# Patient Record
Sex: MALE | Race: WHITE | Employment: OTHER | URBAN - METROPOLITAN AREA
[De-identification: names, ages, dates, MRNs, and addresses within clinical notes are randomized per-mention and may not be internally consistent; named-entity substitution may affect disease eponyms.]

---

## 2023-03-03 ENCOUNTER — APPOINTMENT (OUTPATIENT)
Dept: GENERAL RADIOLOGY | Age: 68
DRG: 602 | End: 2023-03-03
Attending: STUDENT IN AN ORGANIZED HEALTH CARE EDUCATION/TRAINING PROGRAM
Payer: MEDICARE

## 2023-03-03 ENCOUNTER — HOSPITAL ENCOUNTER (INPATIENT)
Age: 68
LOS: 11 days | Discharge: HOME HEALTH | DRG: 602 | End: 2023-03-14
Attending: STUDENT IN AN ORGANIZED HEALTH CARE EDUCATION/TRAINING PROGRAM | Admitting: FAMILY MEDICINE
Payer: MEDICARE

## 2023-03-03 DIAGNOSIS — L03.116 CELLULITIS OF LEFT LOWER EXTREMITY: ICD-10-CM

## 2023-03-03 DIAGNOSIS — I48.91 ATRIAL FIBRILLATION WITH RAPID VENTRICULAR RESPONSE (HCC): ICD-10-CM

## 2023-03-03 DIAGNOSIS — L03.115 CELLULITIS OF RIGHT LOWER EXTREMITY: Primary | ICD-10-CM

## 2023-03-03 PROBLEM — R60.0 EDEMA OF LOWER EXTREMITY: Status: ACTIVE | Noted: 2023-03-03

## 2023-03-03 LAB
ALBUMIN SERPL-MCNC: 2.9 G/DL (ref 3.5–5)
ALBUMIN/GLOB SERPL: 0.6 (ref 1.1–2.2)
ALP SERPL-CCNC: 87 U/L (ref 45–117)
ALT SERPL-CCNC: 20 U/L (ref 12–78)
ANION GAP SERPL CALC-SCNC: 3 MMOL/L (ref 5–15)
AST SERPL-CCNC: 17 U/L (ref 15–37)
BASOPHILS # BLD: 0.1 K/UL (ref 0–0.1)
BASOPHILS NFR BLD: 1 % (ref 0–1)
BILIRUB SERPL-MCNC: 0.4 MG/DL (ref 0.2–1)
BUN SERPL-MCNC: 12 MG/DL (ref 6–20)
BUN/CREAT SERPL: 13 (ref 12–20)
CALCIUM SERPL-MCNC: 8.6 MG/DL (ref 8.5–10.1)
CHLORIDE SERPL-SCNC: 103 MMOL/L (ref 97–108)
CO2 SERPL-SCNC: 33 MMOL/L (ref 21–32)
COMMENT, HOLDF: NORMAL
CREAT SERPL-MCNC: 0.94 MG/DL (ref 0.7–1.3)
DIFFERENTIAL METHOD BLD: ABNORMAL
EOSINOPHIL # BLD: 0.3 K/UL (ref 0–0.4)
EOSINOPHIL NFR BLD: 3 % (ref 0–7)
ERYTHROCYTE [DISTWIDTH] IN BLOOD BY AUTOMATED COUNT: 14.6 % (ref 11.5–14.5)
GLOBULIN SER CALC-MCNC: 4.7 G/DL (ref 2–4)
GLUCOSE SERPL-MCNC: 115 MG/DL (ref 65–100)
HCT VFR BLD AUTO: 41.2 % (ref 36.6–50.3)
HGB BLD-MCNC: 13.2 G/DL (ref 12.1–17)
IMM GRANULOCYTES # BLD AUTO: 0 K/UL (ref 0–0.04)
IMM GRANULOCYTES NFR BLD AUTO: 0 % (ref 0–0.5)
LACTATE SERPL-SCNC: 2 MMOL/L (ref 0.4–2)
LYMPHOCYTES # BLD: 2.4 K/UL (ref 0.8–3.5)
LYMPHOCYTES NFR BLD: 23 % (ref 12–49)
MCH RBC QN AUTO: 31.3 PG (ref 26–34)
MCHC RBC AUTO-ENTMCNC: 32 G/DL (ref 30–36.5)
MCV RBC AUTO: 97.6 FL (ref 80–99)
MONOCYTES # BLD: 1.3 K/UL (ref 0–1)
MONOCYTES NFR BLD: 12 % (ref 5–13)
NEUTS SEG # BLD: 6.3 K/UL (ref 1.8–8)
NEUTS SEG NFR BLD: 61 % (ref 32–75)
NRBC # BLD: 0 K/UL (ref 0–0.01)
NRBC BLD-RTO: 0 PER 100 WBC
PLATELET # BLD AUTO: 186 K/UL (ref 150–400)
PMV BLD AUTO: 11.6 FL (ref 8.9–12.9)
POTASSIUM SERPL-SCNC: 4.2 MMOL/L (ref 3.5–5.1)
PROT SERPL-MCNC: 7.6 G/DL (ref 6.4–8.2)
RBC # BLD AUTO: 4.22 M/UL (ref 4.1–5.7)
SAMPLES BEING HELD,HOLD: NORMAL
SODIUM SERPL-SCNC: 139 MMOL/L (ref 136–145)
TROPONIN I SERPL HS-MCNC: 4 NG/L (ref 0–57)
WBC # BLD AUTO: 10.3 K/UL (ref 4.1–11.1)

## 2023-03-03 PROCEDURE — 84443 ASSAY THYROID STIM HORMONE: CPT

## 2023-03-03 PROCEDURE — 87040 BLOOD CULTURE FOR BACTERIA: CPT

## 2023-03-03 PROCEDURE — 74011250637 HC RX REV CODE- 250/637: Performed by: STUDENT IN AN ORGANIZED HEALTH CARE EDUCATION/TRAINING PROGRAM

## 2023-03-03 PROCEDURE — 85025 COMPLETE CBC W/AUTO DIFF WBC: CPT

## 2023-03-03 PROCEDURE — 73590 X-RAY EXAM OF LOWER LEG: CPT

## 2023-03-03 PROCEDURE — 74011000250 HC RX REV CODE- 250: Performed by: STUDENT IN AN ORGANIZED HEALTH CARE EDUCATION/TRAINING PROGRAM

## 2023-03-03 PROCEDURE — 65270000029 HC RM PRIVATE

## 2023-03-03 PROCEDURE — 71045 X-RAY EXAM CHEST 1 VIEW: CPT

## 2023-03-03 PROCEDURE — 36415 COLL VENOUS BLD VENIPUNCTURE: CPT

## 2023-03-03 PROCEDURE — 83735 ASSAY OF MAGNESIUM: CPT

## 2023-03-03 PROCEDURE — 99285 EMERGENCY DEPT VISIT HI MDM: CPT

## 2023-03-03 PROCEDURE — 83605 ASSAY OF LACTIC ACID: CPT

## 2023-03-03 PROCEDURE — 84484 ASSAY OF TROPONIN QUANT: CPT

## 2023-03-03 PROCEDURE — 65660000001 HC RM ICU INTERMED STEPDOWN

## 2023-03-03 PROCEDURE — 96374 THER/PROPH/DIAG INJ IV PUSH: CPT

## 2023-03-03 PROCEDURE — 80053 COMPREHEN METABOLIC PANEL: CPT

## 2023-03-03 RX ORDER — ACETAMINOPHEN 325 MG/1
650 TABLET ORAL
Status: COMPLETED | OUTPATIENT
Start: 2023-03-03 | End: 2023-03-03

## 2023-03-03 RX ORDER — DILTIAZEM HYDROCHLORIDE 5 MG/ML
20 INJECTION INTRAVENOUS
Status: COMPLETED | OUTPATIENT
Start: 2023-03-03 | End: 2023-03-03

## 2023-03-03 RX ADMIN — DILTIAZEM HYDROCHLORIDE 20 MG: 5 INJECTION INTRAVENOUS at 23:49

## 2023-03-03 RX ADMIN — ACETAMINOPHEN 650 MG: 325 TABLET ORAL at 23:37

## 2023-03-04 LAB
ALBUMIN SERPL-MCNC: 2.8 G/DL (ref 3.5–5)
ALBUMIN/GLOB SERPL: 0.7 (ref 1.1–2.2)
ALP SERPL-CCNC: 82 U/L (ref 45–117)
ALT SERPL-CCNC: 18 U/L (ref 12–78)
ANION GAP SERPL CALC-SCNC: 9 MMOL/L (ref 5–15)
APTT PPP: 25.8 SEC (ref 22.1–31)
AST SERPL-CCNC: 20 U/L (ref 15–37)
BASOPHILS # BLD: 0.1 K/UL (ref 0–0.1)
BASOPHILS NFR BLD: 1 % (ref 0–1)
BILIRUB SERPL-MCNC: 0.4 MG/DL (ref 0.2–1)
BNP SERPL-MCNC: 1513 PG/ML
BUN SERPL-MCNC: 12 MG/DL (ref 6–20)
BUN/CREAT SERPL: 15 (ref 12–20)
CALCIUM SERPL-MCNC: 8.3 MG/DL (ref 8.5–10.1)
CHLORIDE SERPL-SCNC: 106 MMOL/L (ref 97–108)
CO2 SERPL-SCNC: 26 MMOL/L (ref 21–32)
CREAT SERPL-MCNC: 0.78 MG/DL (ref 0.7–1.3)
DIFFERENTIAL METHOD BLD: ABNORMAL
EOSINOPHIL # BLD: 0.3 K/UL (ref 0–0.4)
EOSINOPHIL NFR BLD: 3 % (ref 0–7)
ERYTHROCYTE [DISTWIDTH] IN BLOOD BY AUTOMATED COUNT: 14.6 % (ref 11.5–14.5)
EST. AVERAGE GLUCOSE BLD GHB EST-MCNC: 111 MG/DL
GLOBULIN SER CALC-MCNC: 3.8 G/DL (ref 2–4)
GLUCOSE BLD STRIP.AUTO-MCNC: 103 MG/DL (ref 65–117)
GLUCOSE BLD STRIP.AUTO-MCNC: 118 MG/DL (ref 65–117)
GLUCOSE BLD STRIP.AUTO-MCNC: 138 MG/DL (ref 65–117)
GLUCOSE BLD STRIP.AUTO-MCNC: 188 MG/DL (ref 65–117)
GLUCOSE SERPL-MCNC: 90 MG/DL (ref 65–100)
HBA1C MFR BLD: 5.5 % (ref 4–5.6)
HCT VFR BLD AUTO: 42.3 % (ref 36.6–50.3)
HGB BLD-MCNC: 13.4 G/DL (ref 12.1–17)
IMM GRANULOCYTES # BLD AUTO: 0 K/UL (ref 0–0.04)
IMM GRANULOCYTES NFR BLD AUTO: 0 % (ref 0–0.5)
LYMPHOCYTES # BLD: 2.7 K/UL (ref 0.8–3.5)
LYMPHOCYTES NFR BLD: 22 % (ref 12–49)
MAGNESIUM SERPL-MCNC: 1.9 MG/DL (ref 1.6–2.4)
MAGNESIUM SERPL-MCNC: 1.9 MG/DL (ref 1.6–2.4)
MCH RBC QN AUTO: 31.2 PG (ref 26–34)
MCHC RBC AUTO-ENTMCNC: 31.7 G/DL (ref 30–36.5)
MCV RBC AUTO: 98.4 FL (ref 80–99)
MONOCYTES # BLD: 1.3 K/UL (ref 0–1)
MONOCYTES NFR BLD: 11 % (ref 5–13)
NEUTS SEG # BLD: 7.6 K/UL (ref 1.8–8)
NEUTS SEG NFR BLD: 63 % (ref 32–75)
NRBC # BLD: 0 K/UL (ref 0–0.01)
NRBC BLD-RTO: 0 PER 100 WBC
PLATELET # BLD AUTO: 186 K/UL (ref 150–400)
PMV BLD AUTO: 11.5 FL (ref 8.9–12.9)
POTASSIUM SERPL-SCNC: 4.2 MMOL/L (ref 3.5–5.1)
PROT SERPL-MCNC: 6.6 G/DL (ref 6.4–8.2)
RBC # BLD AUTO: 4.3 M/UL (ref 4.1–5.7)
SERVICE CMNT-IMP: ABNORMAL
SERVICE CMNT-IMP: NORMAL
SODIUM SERPL-SCNC: 141 MMOL/L (ref 136–145)
THERAPEUTIC RANGE,PTTT: NORMAL SECS (ref 58–77)
TSH SERPL DL<=0.05 MIU/L-ACNC: 3.19 UIU/ML (ref 0.36–3.74)
WBC # BLD AUTO: 12 K/UL (ref 4.1–11.1)

## 2023-03-04 PROCEDURE — 83880 ASSAY OF NATRIURETIC PEPTIDE: CPT

## 2023-03-04 PROCEDURE — 65660000001 HC RM ICU INTERMED STEPDOWN

## 2023-03-04 PROCEDURE — 82962 GLUCOSE BLOOD TEST: CPT

## 2023-03-04 PROCEDURE — 74011250637 HC RX REV CODE- 250/637: Performed by: FAMILY MEDICINE

## 2023-03-04 PROCEDURE — 74011000258 HC RX REV CODE- 258: Performed by: FAMILY MEDICINE

## 2023-03-04 PROCEDURE — 83735 ASSAY OF MAGNESIUM: CPT

## 2023-03-04 PROCEDURE — 80053 COMPREHEN METABOLIC PANEL: CPT

## 2023-03-04 PROCEDURE — 85025 COMPLETE CBC W/AUTO DIFF WBC: CPT

## 2023-03-04 PROCEDURE — 74011250637 HC RX REV CODE- 250/637: Performed by: HOSPITALIST

## 2023-03-04 PROCEDURE — 74011250636 HC RX REV CODE- 250/636: Performed by: HOSPITALIST

## 2023-03-04 PROCEDURE — 36415 COLL VENOUS BLD VENIPUNCTURE: CPT

## 2023-03-04 PROCEDURE — 74011000250 HC RX REV CODE- 250: Performed by: FAMILY MEDICINE

## 2023-03-04 PROCEDURE — 74011000250 HC RX REV CODE- 250: Performed by: HOSPITALIST

## 2023-03-04 PROCEDURE — 83036 HEMOGLOBIN GLYCOSYLATED A1C: CPT

## 2023-03-04 PROCEDURE — 74011250636 HC RX REV CODE- 250/636: Performed by: STUDENT IN AN ORGANIZED HEALTH CARE EDUCATION/TRAINING PROGRAM

## 2023-03-04 PROCEDURE — 85730 THROMBOPLASTIN TIME PARTIAL: CPT

## 2023-03-04 RX ORDER — MORPHINE SULFATE 2 MG/ML
4 INJECTION, SOLUTION INTRAMUSCULAR; INTRAVENOUS
Status: DISCONTINUED | OUTPATIENT
Start: 2023-03-04 | End: 2023-03-14 | Stop reason: HOSPADM

## 2023-03-04 RX ORDER — ZINC GLUCONATE 50 MG
1000 TABLET ORAL DAILY
COMMUNITY
Start: 2023-02-06

## 2023-03-04 RX ORDER — FUROSEMIDE 10 MG/ML
40 INJECTION INTRAMUSCULAR; INTRAVENOUS 2 TIMES DAILY
Status: DISCONTINUED | OUTPATIENT
Start: 2023-03-04 | End: 2023-03-04

## 2023-03-04 RX ORDER — ONDANSETRON 2 MG/ML
4 INJECTION INTRAMUSCULAR; INTRAVENOUS
Status: DISCONTINUED | OUTPATIENT
Start: 2023-03-04 | End: 2023-03-14 | Stop reason: HOSPADM

## 2023-03-04 RX ORDER — SODIUM CHLORIDE 0.9 % (FLUSH) 0.9 %
5-40 SYRINGE (ML) INJECTION AS NEEDED
Status: DISCONTINUED | OUTPATIENT
Start: 2023-03-04 | End: 2023-03-14 | Stop reason: HOSPADM

## 2023-03-04 RX ORDER — ONDANSETRON 4 MG/1
4 TABLET, ORALLY DISINTEGRATING ORAL
Status: DISCONTINUED | OUTPATIENT
Start: 2023-03-04 | End: 2023-03-14 | Stop reason: HOSPADM

## 2023-03-04 RX ORDER — METFORMIN HYDROCHLORIDE 500 MG/1
1000 TABLET, EXTENDED RELEASE ORAL 2 TIMES DAILY
COMMUNITY
Start: 2022-12-09

## 2023-03-04 RX ORDER — MICONAZOLE NITRATE 2 %
POWDER (GRAM) TOPICAL 2 TIMES DAILY
Status: DISCONTINUED | OUTPATIENT
Start: 2023-03-04 | End: 2023-03-14 | Stop reason: HOSPADM

## 2023-03-04 RX ORDER — INSULIN LISPRO 100 [IU]/ML
INJECTION, SOLUTION INTRAVENOUS; SUBCUTANEOUS
Status: DISCONTINUED | OUTPATIENT
Start: 2023-03-04 | End: 2023-03-14 | Stop reason: HOSPADM

## 2023-03-04 RX ORDER — DILTIAZEM HYDROCHLORIDE 180 MG/1
180 CAPSULE, EXTENDED RELEASE ORAL DAILY
COMMUNITY

## 2023-03-04 RX ORDER — ACETAMINOPHEN 650 MG/1
650 SUPPOSITORY RECTAL
Status: DISCONTINUED | OUTPATIENT
Start: 2023-03-04 | End: 2023-03-14 | Stop reason: HOSPADM

## 2023-03-04 RX ORDER — ERGOCALCIFEROL 1.25 MG/1
50000 CAPSULE ORAL
Status: DISCONTINUED | OUTPATIENT
Start: 2023-03-04 | End: 2023-03-14 | Stop reason: HOSPADM

## 2023-03-04 RX ORDER — SODIUM CHLORIDE 0.9 % (FLUSH) 0.9 %
5-40 SYRINGE (ML) INJECTION EVERY 8 HOURS
Status: DISCONTINUED | OUTPATIENT
Start: 2023-03-04 | End: 2023-03-14 | Stop reason: HOSPADM

## 2023-03-04 RX ORDER — METOPROLOL SUCCINATE 50 MG/1
100 TABLET, EXTENDED RELEASE ORAL DAILY
Status: DISCONTINUED | OUTPATIENT
Start: 2023-03-04 | End: 2023-03-14 | Stop reason: HOSPADM

## 2023-03-04 RX ORDER — ACETAMINOPHEN 325 MG/1
650 TABLET ORAL
Status: DISCONTINUED | OUTPATIENT
Start: 2023-03-04 | End: 2023-03-14 | Stop reason: HOSPADM

## 2023-03-04 RX ORDER — APIXABAN 5 MG/1
5 TABLET, FILM COATED ORAL 2 TIMES DAILY
COMMUNITY
Start: 2023-02-06

## 2023-03-04 RX ORDER — MICONAZOLE NITRATE 2 %
POWDER (GRAM) TOPICAL 2 TIMES DAILY
Status: DISCONTINUED | OUTPATIENT
Start: 2023-03-04 | End: 2023-03-04 | Stop reason: DRUGHIGH

## 2023-03-04 RX ORDER — IBUPROFEN 200 MG
4 TABLET ORAL AS NEEDED
Status: DISCONTINUED | OUTPATIENT
Start: 2023-03-04 | End: 2023-03-14 | Stop reason: HOSPADM

## 2023-03-04 RX ORDER — POLYETHYLENE GLYCOL 3350 17 G/17G
17 POWDER, FOR SOLUTION ORAL DAILY PRN
Status: DISCONTINUED | OUTPATIENT
Start: 2023-03-04 | End: 2023-03-14 | Stop reason: HOSPADM

## 2023-03-04 RX ORDER — METOPROLOL SUCCINATE 100 MG/1
100 TABLET, EXTENDED RELEASE ORAL DAILY
COMMUNITY
Start: 2023-01-07

## 2023-03-04 RX ORDER — LANOLIN ALCOHOL/MO/W.PET/CERES
1000 CREAM (GRAM) TOPICAL DAILY
Status: DISCONTINUED | OUTPATIENT
Start: 2023-03-04 | End: 2023-03-14 | Stop reason: HOSPADM

## 2023-03-04 RX ORDER — FUROSEMIDE 10 MG/ML
40 INJECTION INTRAMUSCULAR; INTRAVENOUS DAILY
Status: DISCONTINUED | OUTPATIENT
Start: 2023-03-04 | End: 2023-03-11

## 2023-03-04 RX ORDER — ERGOCALCIFEROL 1.25 MG/1
CAPSULE ORAL
COMMUNITY
Start: 2022-12-09

## 2023-03-04 RX ADMIN — MICONAZOLE NITRATE 2 % TOPICAL POWDER: at 18:00

## 2023-03-04 RX ADMIN — APIXABAN 5 MG: 5 TABLET, FILM COATED ORAL at 01:35

## 2023-03-04 RX ADMIN — ACETAMINOPHEN 650 MG: 325 TABLET ORAL at 04:35

## 2023-03-04 RX ADMIN — VANCOMYCIN HYDROCHLORIDE 2500 MG: 10 INJECTION, POWDER, LYOPHILIZED, FOR SOLUTION INTRAVENOUS at 00:16

## 2023-03-04 RX ADMIN — SODIUM CHLORIDE, PRESERVATIVE FREE 10 ML: 5 INJECTION INTRAVENOUS at 15:23

## 2023-03-04 RX ADMIN — CEFEPIME 2 G: 2 INJECTION, POWDER, FOR SOLUTION INTRAVENOUS at 15:23

## 2023-03-04 RX ADMIN — DILTIAZEM HYDROCHLORIDE 7.5 MG/HR: 5 INJECTION, SOLUTION INTRAVENOUS at 19:43

## 2023-03-04 RX ADMIN — ERGOCALCIFEROL 50000 UNITS: 1.25 CAPSULE ORAL at 10:11

## 2023-03-04 RX ADMIN — DILTIAZEM HYDROCHLORIDE 2.5 MG/HR: 5 INJECTION, SOLUTION INTRAVENOUS at 03:43

## 2023-03-04 RX ADMIN — VANCOMYCIN HYDROCHLORIDE 1250 MG: 1.25 INJECTION, POWDER, LYOPHILIZED, FOR SOLUTION INTRAVENOUS at 10:11

## 2023-03-04 RX ADMIN — SODIUM CHLORIDE, PRESERVATIVE FREE 10 ML: 5 INJECTION INTRAVENOUS at 21:31

## 2023-03-04 RX ADMIN — APIXABAN 5 MG: 5 TABLET, FILM COATED ORAL at 10:11

## 2023-03-04 RX ADMIN — CYANOCOBALAMIN TAB 500 MCG 1000 MCG: 500 TAB at 10:11

## 2023-03-04 RX ADMIN — APIXABAN 5 MG: 5 TABLET, FILM COATED ORAL at 21:31

## 2023-03-04 RX ADMIN — FUROSEMIDE 40 MG: 10 INJECTION, SOLUTION INTRAMUSCULAR; INTRAVENOUS at 15:23

## 2023-03-04 RX ADMIN — ACETAMINOPHEN 650 MG: 325 TABLET ORAL at 15:31

## 2023-03-04 RX ADMIN — VANCOMYCIN HYDROCHLORIDE 1250 MG: 1.25 INJECTION, POWDER, LYOPHILIZED, FOR SOLUTION INTRAVENOUS at 21:31

## 2023-03-04 NOTE — ED TRIAGE NOTES
Patient arrives via EMS from home with a CC of cellulitis in bilateral LE. The wounds are weeping with pus. No blood noted. Patient has been experiencing this for 7 days. Patient has a hx of afib, HTN and cellulitis. Patient takes metoprolol and eliquis at home.

## 2023-03-04 NOTE — H&P
History and Physical    Date of Service:  3/3/2023  Primary Care Provider: Stefan Maldonado MD  Source of information: The patient and Chart review    Chief Complaint: Skin Infection (cellulitis)      History of Presenting Illness:   Nicole Tovar is a 75 y.o. male past medical history of atrial fibrillation, long-term anticoagulation therapy on Eliquis, lower extremity edema, cellulitis, hypertension, type 2 diabetes mellitus, scrotal hernia, and obesity presented to the emergency department via EMS from home with chief complaints of unhealed wounds. Patient has multiple wounds of bilateral lower extremities with history of chronic venous stasis. Per reports patient's bilateral lower extremity wounds have been weeping with pus over the last 7 days with increased swelling edema both legs to severe, constant, without specific alleviating factors. There is no reports of fever. On arrival emergency department, initial reported vital signs temperature 98.4 °F, /105, heart rate 113, respiratory rate 20, O2 saturations 99% on room air. Left and right tib-fib fib x-ray showed bilateral lower extremity edema, minimal periosteal reaction along tibia and fibular diaphysis questionable for stress reaction or infection. 12 lead EKG showed atrial fibrillation with rapid ventricular response at 107 bpm.  ED administered diltiazem 20 mg IV x1 dose, Tylenol 650 mg p.o. x1, vancomycin 2500 mg IV x1 dose. Patient is now seen for admission to the hospitalist service. Patient complains of severe pain associated with leg swelling and edema. Despite the same, he notes he is ambulatory without assistance. In addition, he has scrotal open wounds which she notes are chronic and had hernia associated with scrotal swelling for several years. REVIEW OF SYSTEMS:  A comprehensive review of systems was negative except for that written in the History of Present Illness.        PAST MEDICAL HISTORY:  Atrial fibrillation  Chronic lower extremity edema  Hypertension  Type 2 diabetes mellitus  Obesity  Cellulitis  Scrotal hernia    MEDICATIONS:  Eliquis 5 mg p.o. twice daily  Vitamin B12 1000 mcg p.o. daily  Ergocalciferol 1250 mcg 1 capsule p.o. once per week  Metoprolol succinate 100 mg p.o. daily    ALLERGIES:  NO KNOWN DRUG ALLERGIES    SOCIAL HISTORY:  Social Determinants of Health     Tobacco Use: Not on file   Alcohol Use: Not on file   Financial Resource Strain: Not on file   Food Insecurity: Not on file   Transportation Needs: Not on file   Physical Activity: Not on file   Stress: Not on file   Social Connections: Not on file   Intimate Partner Violence: Not on file   Depression: Not on file   Housing Stability: Not on file        Medications were reconciled to the best of my ability given all available resources at the time of admission. Route is PO if not otherwise noted. Family and social history were personally reviewed, all pertinent and relevant details are outlined as above. Objective:   Visit Vitals  /120   Pulse 121   Temp 98.4 °F (36.9 °C)   Resp 22   Ht 5' 10\" (1.778 m)   Wt 117.9 kg (260 lb)   SpO2 100%   BMI 37.31 kg/m²           PHYSICAL EXAM:   General:  Patient is in no acute respiratory distress. Head:  Normocephalic, without obvious abnormality, atraumatic   Eyes:  Conjunctivae/corneas clear. Pupils 2 mm reactive bilateral.   E/N/M/T: Nares normal. Septum midline.  No nasal drainage or sinus tenderness  Tongue midline/ non-edematous  Clear oropharynx   Neck: Normal appearance and movements, symmetrical, trachea midline  No palpable adenopathy  No thyroid enlargement, tenderness or nodules  No carotid bruit   No JVD  Trachea midline   Lungs:   Symmetrical chest expansion and respiratory effort  Clear to auscultation bilaterally   Chest wall:  No tenderness or deformity   Heart:  Tachycardic/ irregular rhythm  Normal S1 and S2; no murmur, click, rub or gallop   Abdomen:   Soft, no tenderness  No rebound, guarding, or rigidity  Non-distended   Bowel sounds normal  No masses or hepatosplenomegaly  No hernias present   Back: No costovertebral angle tenderness  No step-off deformity   Extremities: Extremities normal, atraumatic  No cyanosis   Severe, marked nonpitting bilateral lower extremity edema     Vascular/  Pulses: 2+ radial/ 1+ DP bilateral pulses   Integument/  Skin: Extensive, severe diffuse erythema, tenderness, swelling of both legs with large open stage II wound on the posterior aspect distal right leg  Stage II large open wound on scrotum  Warm and dry   Musculo-      skeletal: Gait not tested  No calf tenderness   Neuro: GCS 15. Moves all extremities x 4 with generalized weakness. No slurred speech. No facial droop. Sensation grossly intact. Psych: Alert, oriented x 3     Geniturinary: Massive swelling scrotal edema/hernia with involution of penis (which is not visible on exam        Data Review:   I have independently reviewed and interpreted patient's lab and all other diagnostic data    Abnormal Labs Reviewed   CBC WITH AUTOMATED DIFF - Abnormal; Notable for the following components:       Result Value    RDW 14.6 (*)     ABS. MONOCYTES 1.3 (*)     All other components within normal limits   METABOLIC PANEL, COMPREHENSIVE - Abnormal; Notable for the following components:    CO2 33 (*)     Anion gap 3 (*)     Glucose 115 (*)     Albumin 2.9 (*)     Globulin 4.7 (*)     A-G Ratio 0.6 (*)     All other components within normal limits       All Micro Results       Procedure Component Value Units Date/Time    CULTURE, BLOOD, PAIRED [[id number]] Collected: 03/03/23 2207    Order Status: Sent Specimen: Blood Updated: 03/03/23 2220            IMAGING:   XR TIB/FIB RT   Final Result   Bilateral lower leg soft tissue edema. Minimal periosteal reaction along the   distal left tibial and fibular diaphyses can be seen with stress reaction or   infection. No bony erosion.       XR TIB/FIB LT   Final Result   Bilateral lower leg soft tissue edema. Minimal periosteal reaction along the   distal left tibial and fibular diaphyses can be seen with stress reaction or   infection. No bony erosion. XR CHEST PORT   Final Result      No acute process. ECG/ECHO:  No results found for this or any previous visit. Notes reviewed from all clinical/nonclinical/nursing services involved in patient's clinical care. Care coordination discussions were held with appropriate clinical/nonclinical/ nursing providers based on care coordination needs. Assessment/ Plan:   Given the patient's current clinical presentation, there is a high level of concern for decompensation if discharged from the emergency department. Complex decision making was performed, which includes reviewing the patient's available past medical records, laboratory results, and imaging studies. Active Problems:    1. Atrial fibrillation with RVR   -admit to IMCU  -order Diltiazem titrated IV infusion  -Goal heart rate less than 100  -Continuous cardiac monitoring  -Consult cardiologist  -Continue  Eliquis    2. Edema of bilateral lower extremities  -Chronic venous stasis  -proBNP 1513  -Order to echocardiogram in a.m.  -Keep legs elevated at rest  -Placed on strict I's and O's and daily weights  -Order Lasix 40 mg IV daily    3. Cellulitis of right lower extremity        Cellulitis of left lower extremity   -Continue vancomycin pharmacy dosing  -Check paired blood cultures    4. Multiple wounds  -Open wounds right distal leg and scrotum  -Continue wound cares and dressing changes  -Consult care team    5. Type 2 diabetes mellitus  -Order Humalog insulin correctional coverage, scheduled blood glucose checks and check hemoglobin A1c level. 6.  Essential hypertension  -Continue metoprolol    7.   Class III obesity  -BMI 37.31 kg/M2  -Would encourage weight loss, reduced calorie diet, lifestyle modifications      DIET: CARB CONSISTENT  ISOLATION PRECAUTIONS: There are currently no Active Isolations  CODE STATUS: FULL CODE  DVT PROPHYLAXIS: Eliquis  FUNCTIONAL STATUS PRIOR TO HOSPITALIZATION: Fully active and ambulatory; able to carry on all self-care without restriction. Ambulatory status/function: By self   EARLY MOBILITY ASSESSMENT: Recommend routine ambulation while hospitalized with the assistance of nursing staff and Recommend an assessment from physical therapy and/or occupational therapy  ANTICIPATED DISCHARGE: Greater than 48 hours. ANTICIPATED DISPOSITION: Home with Home Healthcare      CRITICAL CARE WAS PERFORMED FOR THIS ENCOUNTER: NO.    ADVANCED DIRECTIVE/ CODE STATUS:  FULL CODE as per discussion with patient. Signed By: Chanel Stevens MD     March 3, 2023         Please note that this dictation may have been completed with Dragon, the computer voice recognition software. Quite often unanticipated grammatical, syntax, homophones, and other interpretive errors are inadvertently transcribed by the computer software. Please disregard these errors. Please excuse any errors that have escaped final proofreading.

## 2023-03-04 NOTE — PROGRESS NOTES
LORAINE: Anticipate discharge home pending medical progress. Transportation likely in car with friend or Medicaid transport. Reason for Admission:  afib w/RVR                   RUR Score:   9%                  Plan for utilizing home health:   Has had wound care in the past, but unsure of [facility] provider. PCP: First and Last name:  Stefan Maldonado MD     Name of Practice:    Are you a current patient: Yes/No: yes   Approximate date of last visit: 3-4 months ago   Can you participate in a virtual visit with your PCP:                     Current Advanced Directive/Advance Care Plan: Full Code      Healthcare Decision Maker:   Click here to complete [address] including selection of the Healthcare Decision Maker Relationship (ie \"Primary\")                         Transition of Care Plan:  CM met with patient at bedside. Patient is alert and oriented x4. Demographics confirmed. Patient resides in a senior living apartment with a roommate. There are no steps to enter as there are elevators. No DME. Patient is independent in ADLs and ambulation. He does have a 's license, but no personal vehicle. Has access to Medicaid transportation. Hx: afib on Eliquis, HTN, diabetes type II, obesity. PCP confirmed and pharmacy used [facility] at [address] [place] [address] [facility] [phone]. Care Management Interventions  PCP Verified by CM: Yes (Dr. Hayden Navarrete)  Mode of Transport at Discharge:  Other (see comment) (in car with roommates son)  MyChart Signup: No  Discharge Durable Medical Equipment: No  Physical Therapy Consult: No  Occupational Therapy Consult: No  Speech Therapy Consult: No  Support Systems: Other (Comment)  Confirm Follow Up Transport: Other (see comment) (Medicaid transport)  The Plan for Transition of Care is Related to the Following Treatment Goals : home  Discharge Location  Patient Expects to be Discharged to[de-identified] [id number] [address] [facility]

## 2023-03-04 NOTE — ED TRIAGE NOTES
Pt to er c/o bilateral leg swelling for about a week, has had history of cellulitis in the past, denies fever at home, pt c/o some sob at home.

## 2023-03-04 NOTE — PROGRESS NOTES
Hospitalist Progress Note  Hans Cruz MD  Answering service: [phone] from in house phone        Date of Service:  3/4/2023  NAME:  Gardenia Mathew  :  [id number]  MRN:  [id number]      Admission Summary:   Gardenia Mathew is a 75 y.o. male past medical history of atrial fibrillation, long-term anticoagulation therapy on Eliquis, lower extremity edema, cellulitis, hypertension, type 2 diabetes mellitus, scrotal hernia, and obesity presented to the emergency department via EMS from home with chief complaints of unhealed wounds. Patient has multiple wounds of bilateral lower extremities with history of chronic venous stasis. Per reports patient's bilateral lower extremity wounds have been weeping with pus over the last 7 days with increased swelling edema both legs to severe, constant, without specific alleviating factors. There is no reports of fever. On arrival emergency department, initial reported vital signs temperature 98.4 °F, /105, heart rate 113, respiratory rate 20, O2 saturations 99% on room air. Left and right tib-fib fib x-ray showed bilateral lower extremity edema, minimal periosteal reaction along tibia and fibular diaphysis questionable for stress reaction or infection. 12 lead EKG showed atrial fibrillation with rapid ventricular response at 107 bpm.  ED administered diltiazem 20 mg IV x1 dose, Tylenol 650 mg p.o. x1, vancomycin 2500 mg IV x1 dose. Patient is now seen for admission to the hospitalist service. Patient complains of severe pain associated with leg swelling and edema. Despite the same, he notes he is ambulatory without assistance. In addition, he has scrotal open wounds which she notes are chronic and had hernia associated with scrotal swelling for several years.           Interval history / Subjective:   Pain in both leg  No fever  Breathing ok      Assessment & Plan: 1.  Atrial fibrillation with RVR   -monitor  IMCU  -continue Diltiazem titrated IV infusion  -Goal heart rate less than 100  -Continuous cardiac monitoring  -Consult cardiologist  -Continue  Eliquis     2. Edema of bilateral lower extremities  -Chronic venous stasis  -proBNP 1513  -Order to echocardiogram in a.m.  -Keep legs elevated at rest  -Placed on strict I's and O's and daily weights  - Lasix 40 mg IV daily, continue as long renal funciton tolerate      3. Cellulitis of right lower extremity        Cellulitis of left lower extremity   -Continue vancomycin pharmacy dosing  -Check paired blood cultures  -added iv cefepime due to the extensive imvolvement     4. Multiple wounds  -Open wounds right distal leg and scrotum  -Continue wound cares and dressing changes  -Consult care team     5. Type 2 diabetes mellitus  -Order Humalog insulin correctional coverage, scheduled blood glucose checks and check hemoglobin A1c level. 6.  Essential hypertension  -Continue metoprolol     7. Class III obesity  -BMI 37.31 kg/M2  -Would encourage weight loss, reduced calorie diet, lifestyle modifications    8. Suprapubic catheter           Code status: full   Prophylaxis: eliquis   Care Plan discussed with: pt, rn   Anticipated Disposition: d      Hospital Problems  Never Reviewed            Codes Class Noted POA    Atrial fibrillation with RVR ([facility] [address] ICD-10-CM: I48.91  ICD-9-CM: 427.31  3/3/2023 Unknown        Edema of lower extremity ICD-10-CM: R60.0  ICD-9-CM: 782.3  3/3/2023 Unknown        Cellulitis of right lower extremity ICD-10-CM: L03.115  ICD-9-CM: 682.6  3/3/2023 Unknown        Cellulitis of left lower extremity ICD-10-CM: L03.116  ICD-9-CM: 682.6  3/3/2023 Unknown               Review of Systems:   A comprehensive review of systems was negative except for that written in the HPI. Vital Signs:    Last 24hrs VS reviewed since prior progress note.  Most recent are:  Visit Vitals  /63   Pulse (!) 104   Temp 100.1 °F (37.8 °C)   Resp 22   Ht 5' 10\" (1.778 m)   Wt 117.9 kg (260 lb)   SpO2 97%   BMI 37.31 kg/m²         Intake/Output Summary (Last 24 hours) at 3/4/2023 1429  Last data filed at 3/4/2023 0435  Gross per 24 hour   Intake --   Output 300 ml   Net -300 ml        Physical Examination:     I had a face to face encounter with this patient and independently examined them on 3/4/2023 as outlined below:          General : looks confused, unsure baseline mental status   HEENT: PEERL, EOMI, moist mucus membrane, TM clear  Neck: supple, no JVD, no meningeal signs  Chest: Clear to auscultation bilaterally   CVS: S1 S2 heard, Capillary refill less than 2 seconds  Abd: soft, large inguinal hernia, suprapuic catheter, mild red groin folded shon   Ext:Extensive, severe diffuse erythema, tenderness, swelling of both legs with large open stage II wound on the posterior aspect distal right leg  Stage II large open wound on scrotum    Neuro/Psych:no focal weakness. Skin: warm            Data Review:    Review and/or order of clinical lab test    I have independently reviewed and interpreted patient's lab and all other diagnostic data    Notes reviewed from all clinical/nonclinical/nursing services involved in patient's clinical care. Care coordination discussions were held with appropriate clinical/nonclinical/ nursing providers based on care coordination needs. Labs:     Recent Labs     03/04/23 0410 03/03/23 2159   WBC 12.0* 10.3   HGB 13.4 13.2   HCT 42.3 41.2    186     Recent Labs     03/04/23 0410 03/03/23 2159    139   K 4.2 4.2    103   CO2 26 33*   BUN 12 12   CREA 0.78 0.94   GLU 90 115*   CA 8.3* 8.6   MG 1.9 1.9     Recent Labs     03/04/23 0410 03/03/23 2159   ALT 18 20   AP 82 87   TBILI 0.4 0.4   TP 6.6 7.6   ALB 2.8* 2.9*   GLOB 3.8 4.7*     Recent Labs     03/04/23 0410   APTT 25.8      No results for input(s): FE, TIBC, PSAT, FERR in the last 72 hours.    No results found for: FOL, RBCF   No results for input(s): PH, PCO2, PO2 in the last 72 hours. No results for input(s): CPK, CKNDX, TROIQ in the last 72 hours.     No lab exists for component: CPKMB  No results found for: CHOL, CHOLX, CHLST, CHOLV, HDL, HDLP, LDL, LDLC, DLDLP, TGLX, TRIGL, TRIGP, CHHD, CHHDX  Lab Results   Component Value Date/Time    Glucose (POC) 138 (H) 03/04/2023 01:03 PM    Glucose (POC) 103 03/04/2023 09:04 AM     No results found for: COLOR, APPRN, SPGRU, REFSG, HUBER, PROTU, GLUCU, KETU, BILU, UROU, SHARON, LEUKU, GLUKE, EPSU, BACTU, WBCU, RBCU, CASTS, UCRY      Medications Reviewed:     Current Facility-Administered Medications   Medication Dose Route Frequency    apixaban (ELIQUIS) tablet 5 mg  5 mg Oral Q12H    ergocalciferol capsule 50,000 Units  50,000 Units Oral every Saturday    metoprolol succinate (TOPROL-XL) XL tablet 100 mg  100 mg Oral DAILY    cyanocobalamin (VITAMIN B12) tablet 1,000 mcg  1,000 mcg Oral DAILY    glucose chewable tablet 16 g  4 Tablet Oral PRN    glucagon (GLUCAGEN) injection 1 mg  1 mg IntraMUSCular PRN    dextrose 10 % infusion 0-250 mL  0-250 mL IntraVENous PRN    insulin lispro (HUMALOG) injection   SubCUTAneous AC&HS    sodium chloride (NS) flush 5-40 mL  5-40 mL IntraVENous Q8H    sodium chloride (NS) flush 5-40 mL  5-40 mL IntraVENous PRN    acetaminophen (TYLENOL) tablet 650 mg  650 mg Oral Q6H PRN    Or    acetaminophen (TYLENOL) suppository 650 mg  650 mg Rectal Q6H PRN    polyethylene glycol (MIRALAX) packet 17 g  17 g Oral DAILY PRN    ondansetron (ZOFRAN ODT) tablet 4 mg  4 mg Oral Q8H PRN    Or    ondansetron (ZOFRAN) injection 4 mg  4 mg IntraVENous Q6H PRN    dilTIAZem (CARDIZEM) 125 mg in dextrose 5% 125 mL infusion  0-15 mg/hr IntraVENous TITRATE    miconazole (MICOTIN) 2 % powder   Topical BID    morphine injection 4 mg  4 mg IntraVENous Q4H PRN    vancomycin - pharmacy to dose   Other Rx Dosing/Monitoring    vancomycin (VANCOCIN) 1,250 mg in 0.9% sodium chloride 250 mL ([id number])  1,250 mg IntraVENous Q12H    cefepime (MAXIPIME) 2 g in 0.9% sodium chloride (MBP/ADV) 100 mL MBP  2 g IntraVENous Q12H    furosemide (LASIX) injection 40 mg  40 mg IntraVENous DAILY    miconazole (MICOTIN) 2 % powder   Topical BID     ______________________________________________________________________  EXPECTED LENGTH OF STAY: - - -  ACTUAL LENGTH OF STAY:          1                 Mary Beth Serrato MD

## 2023-03-04 NOTE — ED PROVIDER NOTES
60-year-old male presents ED for evaluation of possible bilateral lower leg cellulitis. Patient reports he has a history of diabetes has chronic leg cellulitis. Has been worsening over the last 7 days. Reports worsening pain and some pus weeping from his wounds. Patient has history of A-fib and has rate control medications and Eliquis at home. Has been out of several medications for day or so. Has had some occasional shortness of breath denies chest pain. Denies fevers or chills      Skin Infection  Pertinent negatives include no chest pain, no abdominal pain and no shortness of breath. No past medical history on file. No past surgical history on file. No family history on file. Social History     Socioeconomic History    Marital status: Not on file     Spouse name: Not on file    Number of children: Not on file    Years of education: Not on file    Highest education level: Not on file   Occupational History    Not on file   Tobacco Use    Smoking status: Not on file    Smokeless tobacco: Not on file   Substance and Sexual Activity    Alcohol use: Not on file    Drug use: Not on file    Sexual activity: Not on file   Other Topics Concern    Not on file   Social History Narrative    Not on file     Social Determinants of Health     Financial Resource Strain: Not on file   Food Insecurity: Not on file   Transportation Needs: Not on file   Physical Activity: Not on file   Stress: Not on file   Social Connections: Not on file   Intimate Partner Violence: Not on file   Housing Stability: Not on file         ALLERGIES: Patient has no known allergies. Review of Systems   Constitutional:  Negative for chills and fever. Respiratory:  Negative for shortness of breath. Cardiovascular:  Positive for leg swelling. Negative for chest pain. Gastrointestinal:  Negative for abdominal pain. Skin:  Positive for wound.      Vitals:    03/03/23 2152   Temp: 98.4 °F (36.9 °C)   Weight: 117.9 kg (260 lb) Height: 5' 10\" (1.778 m)            Physical Exam  Vitals and nursing note reviewed. Constitutional:       Appearance: He is obese. Cardiovascular:      Rate and Rhythm: Tachycardia present. Rhythm irregular. Pulses: Normal pulses. Heart sounds: Normal heart sounds. Pulmonary:      Effort: Pulmonary effort is normal.      Breath sounds: Normal breath sounds. Abdominal:      General: Abdomen is flat. Bowel sounds are normal.   Musculoskeletal:      Right lower leg: Edema present. Left lower leg: Edema present. Skin:     General: Skin is warm. Capillary Refill: Capillary refill takes less than 2 seconds. Findings: Lesion present. Comments: Bilateral weeping cellulitic and edematous appearing legs. Neurological:      General: No focal deficit present. Mental Status: He is alert. Medical Decision Making  Differential includes heart failure, cellulitis, osteomyelitis,    Amount and/or Complexity of Data Reviewed  Labs: ordered. Radiology: ordered. ECG/medicine tests: ordered. Risk  OTC drugs. Prescription drug management. Decision regarding hospitalization. ED Course as of 03/03/23 2340   Fri Mar 03, 2023   2248 ECG performed at 2210 shows A-fib with RVR, rate of 107, nonspecific ST abnormalities, no STEMI. [WG]      ED Course User Index  [WG] Janeal Cons, DO       Procedures    Perfect Serve Consult for Admission  11:41 PM    ED Room Number: TA91/96  Patient Name and age:  Temitope Gallegos 75 y.o.  male  Working Diagnosis:   1. Cellulitis of right lower extremity    2. Cellulitis of left lower extremity    3.  Atrial fibrillation with rapid ventricular response ([address]        COVID-19 Suspicion:  no  Sepsis present:  no  Reassessment needed: no  Code Status:  Full Code  Readmission: no  Isolation Requirements:  no  Recommended Level of Care:  telemetry  Department:[facility] Adult ED - 21   Other: 79-year-old male with history of diabetes, A-fib on anticoagulation who presents ED for evaluation of worsening bilateral lower extremity leg edema, erythema, pain. Patient is in A-fib with RVR on arrival tachycardic. He is afebrile. Exam shows bilateral edema and erythema bilateral legs consistent with lymphedema versus cellulitis. Dressings are old and adhered to the patient's skin. Right leg looks worse than left. X-rays were performed to evaluate for possible osteomyelitis. This included x-ray tib-fib bilateral.  This shows bilateral lower leg soft tissue edema, minimal periosteal reaction along the distal left tibia fibular diaphysis which can see with a stress reaction versus infection. No bony erosion. Chest x-ray is normal.  Patient was given Cardizem for A-fib with RVR. He has no leukocytosis, serum chemistry shows mild hyperglycemia. Cardiac troponin of 4. Lactic acid 2.0. Patient was given vancomycin for leg cellulitis,Patient requires admission for cellulitis, pedal edema, A-fib with RVR. Faith Delgado DO has spent 35 minutes of critical care time involved in lab review, and documentation. During this entire length of time I was immediately available to the patient. Critical Care: The reason for providing this level of medical care for this critically ill patient was due a critical illness that impaired one or more vital organ systems such that there was a high probability of imminent or life threatening deterioration in the patients condition. This care involved high complexity decision making to assess, manipulate, and support vital system functions, to treat this degreee vital organ system failure and to prevent further life threatening deterioration of the patients condition.

## 2023-03-04 NOTE — ED NOTES
TRANSFER - OUT REPORT:    Verbal report given to [facility] (name) on Ernesto Rowe  being transferred to [address] [place] [address] (unit) for routine progression of care       Report consisted of patients Situation, Background, Assessment and   Recommendations(SBAR). Information from the following report(s) SBAR, ED Summary, MAR, and Recent Results was reviewed with the receiving nurse. Lines:   Peripheral IV 03/03/23 Left Antecubital (Active)        Opportunity for questions and clarification was provided.       Patient transported with:   Monitor  Registered Nurse

## 2023-03-04 NOTE — PROGRESS NOTES
Pharmacist Note - Vancomycin Dosing    Consult provided for this 75 y.o. male for indication of bilateral LE cellulitis. Antibiotic regimen(s): vancomycin   Patient on vancomycin PTA? NO     Recent Labs     23  0410 23  2159   WBC 12.0* 10.3   CREA 0.78 0.94   BUN 12 12     Frequency of BMP: daily x 3   Height: 177.8 cm  Weight: 117.9 kg  Est CrCl: ? 100 ml/min  Temp (24hrs), Av.1 °F (36.7 °C), Min:97.8 °F (36.6 °C), Max:98.4 °F (36.9 °C)    Cultures:  3/3 blood - NG, preliminary     MRSA Swab ordered (if applicable)? N/A    The plan below is expected to result in a target range of AUC/MOIZ 400-500    Therapy was initiated with a loading dose of 2500 mg IV x 1 and will be followed by a maintenance dose of 1250 mg IV every 12 hours. Pharmacy to follow patient daily and order levels / make dose adjustments as appropriate. *Vancomycin has been dosed used Bayesian kinetics software to target an AUC/MOIZ of 400-600, which provides adequate exposure for an assumed infection due to MRSA with an MOIZ of 1 or less while reducing the risk of nephrotoxicity as seen with traditional trough based dosing goals.

## 2023-03-05 LAB
ALBUMIN SERPL-MCNC: 2.6 G/DL (ref 3.5–5)
ALBUMIN/GLOB SERPL: 0.7 (ref 1.1–2.2)
ALP SERPL-CCNC: 75 U/L (ref 45–117)
ALT SERPL-CCNC: 17 U/L (ref 12–78)
ANION GAP SERPL CALC-SCNC: 7 MMOL/L (ref 5–15)
APTT PPP: 28.9 SEC (ref 22.1–31)
AST SERPL-CCNC: 20 U/L (ref 15–37)
BASOPHILS # BLD: 0.1 K/UL (ref 0–0.1)
BASOPHILS NFR BLD: 1 % (ref 0–1)
BILIRUB SERPL-MCNC: 0.6 MG/DL (ref 0.2–1)
BUN SERPL-MCNC: 10 MG/DL (ref 6–20)
BUN/CREAT SERPL: 14 (ref 12–20)
CALCIUM SERPL-MCNC: 8.3 MG/DL (ref 8.5–10.1)
CHLORIDE SERPL-SCNC: 105 MMOL/L (ref 97–108)
CO2 SERPL-SCNC: 27 MMOL/L (ref 21–32)
CREAT SERPL-MCNC: 0.74 MG/DL (ref 0.7–1.3)
DIFFERENTIAL METHOD BLD: ABNORMAL
EOSINOPHIL # BLD: 0.3 K/UL (ref 0–0.4)
EOSINOPHIL NFR BLD: 3 % (ref 0–7)
ERYTHROCYTE [DISTWIDTH] IN BLOOD BY AUTOMATED COUNT: 14.8 % (ref 11.5–14.5)
GLOBULIN SER CALC-MCNC: 3.8 G/DL (ref 2–4)
GLUCOSE BLD STRIP.AUTO-MCNC: 105 MG/DL (ref 65–117)
GLUCOSE BLD STRIP.AUTO-MCNC: 113 MG/DL (ref 65–117)
GLUCOSE BLD STRIP.AUTO-MCNC: 116 MG/DL (ref 65–117)
GLUCOSE BLD STRIP.AUTO-MCNC: 129 MG/DL (ref 65–117)
GLUCOSE SERPL-MCNC: 95 MG/DL (ref 65–100)
HCT VFR BLD AUTO: 41.2 % (ref 36.6–50.3)
HGB BLD-MCNC: 12.9 G/DL (ref 12.1–17)
IMM GRANULOCYTES # BLD AUTO: 0 K/UL (ref 0–0.04)
IMM GRANULOCYTES NFR BLD AUTO: 0 % (ref 0–0.5)
INR PPP: 1.1 (ref 0.9–1.1)
LYMPHOCYTES # BLD: 1.9 K/UL (ref 0.8–3.5)
LYMPHOCYTES NFR BLD: 19 % (ref 12–49)
MAGNESIUM SERPL-MCNC: 1.8 MG/DL (ref 1.6–2.4)
MCH RBC QN AUTO: 31.6 PG (ref 26–34)
MCHC RBC AUTO-ENTMCNC: 31.3 G/DL (ref 30–36.5)
MCV RBC AUTO: 101 FL (ref 80–99)
MONOCYTES # BLD: 1.2 K/UL (ref 0–1)
MONOCYTES NFR BLD: 12 % (ref 5–13)
NEUTS SEG # BLD: 6.5 K/UL (ref 1.8–8)
NEUTS SEG NFR BLD: 65 % (ref 32–75)
NRBC # BLD: 0 K/UL (ref 0–0.01)
NRBC BLD-RTO: 0 PER 100 WBC
PLATELET # BLD AUTO: 168 K/UL (ref 150–400)
PMV BLD AUTO: 12 FL (ref 8.9–12.9)
POTASSIUM SERPL-SCNC: 4.1 MMOL/L (ref 3.5–5.1)
PROT SERPL-MCNC: 6.4 G/DL (ref 6.4–8.2)
PROTHROMBIN TIME: 11.4 SEC (ref 9–11.1)
RBC # BLD AUTO: 4.08 M/UL (ref 4.1–5.7)
SERVICE CMNT-IMP: ABNORMAL
SERVICE CMNT-IMP: NORMAL
SODIUM SERPL-SCNC: 139 MMOL/L (ref 136–145)
THERAPEUTIC RANGE,PTTT: NORMAL SECS (ref 58–77)
WBC # BLD AUTO: 10 K/UL (ref 4.1–11.1)

## 2023-03-05 PROCEDURE — 85610 PROTHROMBIN TIME: CPT

## 2023-03-05 PROCEDURE — 74011250636 HC RX REV CODE- 250/636: Performed by: HOSPITALIST

## 2023-03-05 PROCEDURE — 74011000258 HC RX REV CODE- 258: Performed by: HOSPITALIST

## 2023-03-05 PROCEDURE — 74011000258 HC RX REV CODE- 258: Performed by: FAMILY MEDICINE

## 2023-03-05 PROCEDURE — 80053 COMPREHEN METABOLIC PANEL: CPT

## 2023-03-05 PROCEDURE — 82962 GLUCOSE BLOOD TEST: CPT

## 2023-03-05 PROCEDURE — 85025 COMPLETE CBC W/AUTO DIFF WBC: CPT

## 2023-03-05 PROCEDURE — 85730 THROMBOPLASTIN TIME PARTIAL: CPT

## 2023-03-05 PROCEDURE — 74011250637 HC RX REV CODE- 250/637: Performed by: FAMILY MEDICINE

## 2023-03-05 PROCEDURE — 74011000250 HC RX REV CODE- 250: Performed by: FAMILY MEDICINE

## 2023-03-05 PROCEDURE — 83735 ASSAY OF MAGNESIUM: CPT

## 2023-03-05 PROCEDURE — 36415 COLL VENOUS BLD VENIPUNCTURE: CPT

## 2023-03-05 PROCEDURE — 65660000001 HC RM ICU INTERMED STEPDOWN

## 2023-03-05 RX ADMIN — SODIUM CHLORIDE, PRESERVATIVE FREE 10 ML: 5 INJECTION INTRAVENOUS at 16:16

## 2023-03-05 RX ADMIN — SODIUM CHLORIDE, PRESERVATIVE FREE 10 ML: 5 INJECTION INTRAVENOUS at 09:30

## 2023-03-05 RX ADMIN — ACETAMINOPHEN 650 MG: 325 TABLET ORAL at 09:23

## 2023-03-05 RX ADMIN — APIXABAN 5 MG: 5 TABLET, FILM COATED ORAL at 09:23

## 2023-03-05 RX ADMIN — MICONAZOLE NITRATE 2 % TOPICAL POWDER: at 09:00

## 2023-03-05 RX ADMIN — APIXABAN 5 MG: 5 TABLET, FILM COATED ORAL at 22:22

## 2023-03-05 RX ADMIN — VANCOMYCIN HYDROCHLORIDE 1250 MG: 1.25 INJECTION, POWDER, LYOPHILIZED, FOR SOLUTION INTRAVENOUS at 12:03

## 2023-03-05 RX ADMIN — VANCOMYCIN HYDROCHLORIDE 1250 MG: 1.25 INJECTION, POWDER, LYOPHILIZED, FOR SOLUTION INTRAVENOUS at 22:21

## 2023-03-05 RX ADMIN — ACETAMINOPHEN 650 MG: 325 TABLET ORAL at 02:42

## 2023-03-05 RX ADMIN — DILTIAZEM HYDROCHLORIDE 12 MG/HR: 5 INJECTION, SOLUTION INTRAVENOUS at 20:59

## 2023-03-05 RX ADMIN — DILTIAZEM HYDROCHLORIDE 12 MG/HR: 5 INJECTION, SOLUTION INTRAVENOUS at 10:00

## 2023-03-05 RX ADMIN — CEFEPIME 2 G: 2 INJECTION, POWDER, FOR SOLUTION INTRAVENOUS at 02:41

## 2023-03-05 RX ADMIN — SODIUM CHLORIDE, PRESERVATIVE FREE 10 ML: 5 INJECTION INTRAVENOUS at 22:23

## 2023-03-05 RX ADMIN — METOPROLOL SUCCINATE 100 MG: 50 TABLET, EXTENDED RELEASE ORAL at 09:23

## 2023-03-05 RX ADMIN — CEFEPIME 2 G: 2 INJECTION, POWDER, FOR SOLUTION INTRAVENOUS at 16:16

## 2023-03-05 RX ADMIN — CYANOCOBALAMIN TAB 500 MCG 1000 MCG: 500 TAB at 09:23

## 2023-03-05 RX ADMIN — FUROSEMIDE 40 MG: 10 INJECTION, SOLUTION INTRAMUSCULAR; INTRAVENOUS at 09:23

## 2023-03-05 RX ADMIN — MICONAZOLE NITRATE 2 % TOPICAL POWDER: at 18:00

## 2023-03-05 NOTE — PROGRESS NOTES
Hospitalist Progress Note  Glenna Mascorro MD  Answering service: [phone] from in house phone        Date of Service:  3/5/2023  NAME:  Dimitry Thomas  :    MRN:  [id number]      Admission Summary:   Dimitry Thomas is a 75 y.o. male past medical history of atrial fibrillation, long-term anticoagulation therapy on Eliquis, lower extremity edema, cellulitis, hypertension, type 2 diabetes mellitus, scrotal hernia, and obesity presented to the emergency department via EMS from home with chief complaints of unhealed wounds. Patient has multiple wounds of bilateral lower extremities with history of chronic venous stasis. Per reports patient's bilateral lower extremity wounds have been weeping with pus over the last 7 days with increased swelling edema both legs to severe, constant, without specific alleviating factors. There is no reports of fever. On arrival emergency department, initial reported vital signs temperature 98.4 °F, /105, heart rate 113, respiratory rate 20, O2 saturations 99% on room air. Left and right tib-fib fib x-ray showed bilateral lower extremity edema, minimal periosteal reaction along tibia and fibular diaphysis questionable for stress reaction or infection. 12 lead EKG showed atrial fibrillation with rapid ventricular response at 107 bpm.  ED administered diltiazem 20 mg IV x1 dose, Tylenol 650 mg p.o. x1, vancomycin 2500 mg IV x1 dose. Patient is now seen for admission to the hospitalist service. Patient complains of severe pain associated with leg swelling and edema. Despite the same, he notes he is ambulatory without assistance. In addition, he has scrotal open wounds which she notes are chronic and had hernia associated with scrotal swelling for several years.           Interval history / Subjective:   Pain in both leg  No fever  Breathing ok   Rate controlled Assessment & Plan:      1. Atrial fibrillation with RVR   -monitor  IMCU  -continue Diltiazem titrated IV infusion  -Goal heart rate less than 100  -Continuous cardiac monitoring  -Consult cardiologist  -Continue  Eliquis  Echo pending      2. Edema of bilateral lower extremities  -Chronic venous stasis  -proBNP 1513  -Keep legs elevated at rest  -Placed on strict I's and O's and daily weights  - Lasix 40 mg IV daily, continue as long renal funciton tolerate      3. Cellulitis of right lower extremity        Cellulitis of left lower extremity   -Continue vancomycin pharmacy dosing  -Check paired blood cultures  -added iv cefepime due to the extensive imvolvement 3/4      4. Multiple wounds  -Open wounds right distal leg and scrotum  -Continue wound cares and dressing changes  -Consult care team     5. Type 2 diabetes mellitus  -Order Humalog insulin correctional coverage, scheduled blood glucose checks and check hemoglobin A1c level. 6.  Essential hypertension  -Continue metoprolol     7. Class III obesity  -BMI 37.31 kg/M2  -Would encourage weight loss, reduced calorie diet, lifestyle modifications    8. Suprapubic catheter           Code status: full   Prophylaxis: eliquis   Care Plan discussed with: pt, rn   Anticipated Disposition: tbd , pt, ot eval, pt was living with a roommate prior, may return back to [facility] Problems  Never Reviewed            Codes Class Noted POA    Atrial fibrillation with RVR ([facility] [address] ICD-10-CM: I48.91  ICD-9-CM: 427.31  3/3/2023 Unknown        Edema of lower extremity ICD-10-CM: R60.0  ICD-9-CM: 782.3  3/3/2023 Unknown        Cellulitis of right lower extremity ICD-10-CM: L03.115  ICD-9-CM: 682.6  3/3/2023 Unknown        Cellulitis of left lower extremity ICD-10-CM: L03.116  ICD-9-CM: 682.6  3/3/2023 Unknown             Review of Systems:   A comprehensive review of systems was negative except for that written in the HPI.          Vital Signs:    Last 24hrs VS reviewed since prior progress note. Most recent are:  Visit Vitals  /73 (BP 1 Location: Right upper arm, BP Patient Position: At rest)   Pulse 94   Temp 98 °F (36.7 °C)   Resp 24   Ht 5' 10\" (1.778 m)   Wt 139.9 kg (308 lb 6.8 oz)   SpO2 96%   BMI 44.25 kg/m²         Intake/Output Summary (Last 24 hours) at 3/5/2023 1453  Last data filed at 3/5/2023 1130  Gross per 24 hour   Intake --   Output 2400 ml   Net -2400 ml          Physical Examination:     I had a face to face encounter with this patient and independently examined them on 3/5/2023 as outlined below:          General : looks confused, unsure baseline mental status   HEENT: PEERL, EOMI, moist mucus membrane, TM clear  Neck: supple, no JVD, no meningeal signs  Chest: Clear to auscultation bilaterally   CVS: S1 S2 heard, Capillary refill less than 2 seconds  Abd: soft, large inguinal hernia, suprapuic catheter, mild red groin folded shon   Ext:Extensive, severe diffuse erythema, tenderness, swelling of both legs with large open stage II wound on the posterior aspect distal right leg  Stage II large open wound on scrotum    Neuro/Psych:no focal weakness. Skin: warm            Data Review:    Review and/or order of clinical lab test    I have independently reviewed and interpreted patient's lab and all other diagnostic data    Notes reviewed from all clinical/nonclinical/nursing services involved in patient's clinical care. Care coordination discussions were held with appropriate clinical/nonclinical/ nursing providers based on care coordination needs.      Labs:     Recent Labs     03/05/23 0322 03/04/23 0410   WBC 10.0 12.0*   HGB 12.9 13.4   HCT 41.2 42.3    186       Recent Labs     03/05/23  0322 03/05/23  0317 03/04/23  0410 03/03/23  2159   NA  --  139 141 139   K  --  4.1 4.2 4.2   CL  --  105 106 103   CO2  --  27 26 33*   BUN  --  10 12 12   CREA  --  0.74 0.78 0.94   GLU  --  95 90 115*   CA  --  8.3* 8.3* 8.6   MG 1.8  --  1.9 1.9 Recent Labs     03/05/23  0317 03/04/23  0410 03/03/23  2159   ALT 17 18 20   AP 75 82 87   TBILI 0.6 0.4 0.4   TP 6.4 6.6 7.6   ALB 2.6* 2.8* 2.9*   GLOB 3.8 3.8 4.7*       Recent Labs     03/05/23  0349 03/04/23  0410   INR 1.1  --    PTP 11.4*  --    APTT 28.9 25.8        No results for input(s): FE, TIBC, PSAT, FERR in the last 72 hours. No results found for: FOL, RBCF   No results for input(s): PH, PCO2, PO2 in the last 72 hours. No results for input(s): CPK, CKNDX, TROIQ in the last 72 hours. No lab exists for component: CPKMB  No results found for: CHOL, CHOLX, CHLST, CHOLV, HDL, HDLP, LDL, LDLC, DLDLP, TGLX, TRIGL, TRIGP, CHHD, CHHDX  Lab Results   Component Value Date/Time    Glucose (POC) 129 (H) 03/05/2023 12:30 PM    Glucose (POC) 105 03/05/2023 09:19 AM    Glucose (POC) 188 (H) 03/04/2023 09:15 PM    Glucose (POC) 118 (H) 03/04/2023 05:11 PM    Glucose (POC) 138 (H) 03/04/2023 01:03 PM     No results found for: COLOR, APPRN, SPGRU, REFSG, HUBER, PROTU, GLUCU, KETU, BILU, UROU, SHARON, LEUKU, GLUKE, EPSU, BACTU, WBCU, RBCU, CASTS, UCRY      Medications Reviewed:     Current Facility-Administered Medications   Medication Dose Route Frequency    [START ON 3/6/2023] Vancomycin random due 3/6 with am labs. Thanks!    Other ONCE    apixaban (ELIQUIS) tablet 5 mg  5 mg Oral Q12H    ergocalciferol capsule 50,000 Units  50,000 Units Oral every Saturday    metoprolol succinate (TOPROL-XL) XL tablet 100 mg  100 mg Oral DAILY    cyanocobalamin (VITAMIN B12) tablet 1,000 mcg  1,000 mcg Oral DAILY    glucose chewable tablet 16 g  4 Tablet Oral PRN    glucagon (GLUCAGEN) injection 1 mg  1 mg IntraMUSCular PRN    dextrose 10 % infusion 0-250 mL  0-250 mL IntraVENous PRN    insulin lispro (HUMALOG) injection   SubCUTAneous AC&HS    sodium chloride (NS) flush 5-40 mL  5-40 mL IntraVENous Q8H    sodium chloride (NS) flush 5-40 mL  5-40 mL IntraVENous PRN    acetaminophen (TYLENOL) tablet 650 mg  650 mg Oral Q6H PRN    Or    acetaminophen (TYLENOL) suppository 650 mg  650 mg Rectal Q6H PRN    polyethylene glycol (MIRALAX) packet 17 g  17 g Oral DAILY PRN    ondansetron (ZOFRAN ODT) tablet 4 mg  4 mg Oral Q8H PRN    Or    ondansetron (ZOFRAN) injection 4 mg  4 mg IntraVENous Q6H PRN    dilTIAZem (CARDIZEM) 125 mg in dextrose 5% 125 mL infusion  0-15 mg/hr IntraVENous TITRATE    morphine injection 4 mg  4 mg IntraVENous Q4H PRN    vancomycin - pharmacy to dose   Other Rx Dosing/Monitoring    vancomycin (VANCOCIN) 1,250 mg in 0.9% sodium chloride 250 mL ([id number])  1,250 mg IntraVENous Q12H    cefepime (MAXIPIME) 2 g in 0.9% sodium chloride (MBP/ADV) 100 mL MBP  2 g IntraVENous Q12H    furosemide (LASIX) injection 40 mg  40 mg IntraVENous DAILY    miconazole (MICOTIN) 2 % powder   Topical BID     ______________________________________________________________________  EXPECTED LENGTH OF STAY: - - -  ACTUAL LENGTH OF STAY:          2                 Tae Pierre MD

## 2023-03-06 LAB
ALBUMIN SERPL-MCNC: 2.4 G/DL (ref 3.5–5)
ALBUMIN/GLOB SERPL: 0.6 (ref 1.1–2.2)
ALP SERPL-CCNC: 74 U/L (ref 45–117)
ALT SERPL-CCNC: 14 U/L (ref 12–78)
ANION GAP SERPL CALC-SCNC: 5 MMOL/L (ref 5–15)
AST SERPL-CCNC: 14 U/L (ref 15–37)
BASOPHILS # BLD: 0.1 K/UL (ref 0–0.1)
BASOPHILS NFR BLD: 1 % (ref 0–1)
BILIRUB SERPL-MCNC: 0.5 MG/DL (ref 0.2–1)
BUN SERPL-MCNC: 12 MG/DL (ref 6–20)
BUN/CREAT SERPL: 14 (ref 12–20)
CALCIUM SERPL-MCNC: 8.5 MG/DL (ref 8.5–10.1)
CHLORIDE SERPL-SCNC: 104 MMOL/L (ref 97–108)
CO2 SERPL-SCNC: 31 MMOL/L (ref 21–32)
CREAT SERPL-MCNC: 0.83 MG/DL (ref 0.7–1.3)
DIFFERENTIAL METHOD BLD: ABNORMAL
EOSINOPHIL # BLD: 0.3 K/UL (ref 0–0.4)
EOSINOPHIL NFR BLD: 4 % (ref 0–7)
ERYTHROCYTE [DISTWIDTH] IN BLOOD BY AUTOMATED COUNT: 14.6 % (ref 11.5–14.5)
GLOBULIN SER CALC-MCNC: 4.2 G/DL (ref 2–4)
GLUCOSE BLD STRIP.AUTO-MCNC: 105 MG/DL (ref 65–117)
GLUCOSE BLD STRIP.AUTO-MCNC: 109 MG/DL (ref 65–117)
GLUCOSE BLD STRIP.AUTO-MCNC: 110 MG/DL (ref 65–117)
GLUCOSE BLD STRIP.AUTO-MCNC: 116 MG/DL (ref 65–117)
GLUCOSE SERPL-MCNC: 100 MG/DL (ref 65–100)
HCT VFR BLD AUTO: 39.3 % (ref 36.6–50.3)
HGB BLD-MCNC: 12.2 G/DL (ref 12.1–17)
IMM GRANULOCYTES # BLD AUTO: 0 K/UL (ref 0–0.04)
IMM GRANULOCYTES NFR BLD AUTO: 0 % (ref 0–0.5)
LYMPHOCYTES # BLD: 1.7 K/UL (ref 0.8–3.5)
LYMPHOCYTES NFR BLD: 18 % (ref 12–49)
MAGNESIUM SERPL-MCNC: 1.9 MG/DL (ref 1.6–2.4)
MCH RBC QN AUTO: 30.5 PG (ref 26–34)
MCHC RBC AUTO-ENTMCNC: 31 G/DL (ref 30–36.5)
MCV RBC AUTO: 98.3 FL (ref 80–99)
MONOCYTES # BLD: 1.1 K/UL (ref 0–1)
MONOCYTES NFR BLD: 12 % (ref 5–13)
NEUTS SEG # BLD: 6.3 K/UL (ref 1.8–8)
NEUTS SEG NFR BLD: 65 % (ref 32–75)
NRBC # BLD: 0 K/UL (ref 0–0.01)
NRBC BLD-RTO: 0 PER 100 WBC
PHOSPHATE SERPL-MCNC: 3.1 MG/DL (ref 2.6–4.7)
PLATELET # BLD AUTO: 199 K/UL (ref 150–400)
PMV BLD AUTO: 11.6 FL (ref 8.9–12.9)
POTASSIUM SERPL-SCNC: 3.6 MMOL/L (ref 3.5–5.1)
PROT SERPL-MCNC: 6.6 G/DL (ref 6.4–8.2)
RBC # BLD AUTO: 4 M/UL (ref 4.1–5.7)
SERVICE CMNT-IMP: NORMAL
SODIUM SERPL-SCNC: 140 MMOL/L (ref 136–145)
VANCOMYCIN SERPL-MCNC: 21 UG/ML
WBC # BLD AUTO: 9.4 K/UL (ref 4.1–11.1)

## 2023-03-06 PROCEDURE — 82962 GLUCOSE BLOOD TEST: CPT

## 2023-03-06 PROCEDURE — 80202 ASSAY OF VANCOMYCIN: CPT

## 2023-03-06 PROCEDURE — 74011250637 HC RX REV CODE- 250/637: Performed by: FAMILY MEDICINE

## 2023-03-06 PROCEDURE — 97161 PT EVAL LOW COMPLEX 20 MIN: CPT

## 2023-03-06 PROCEDURE — 97165 OT EVAL LOW COMPLEX 30 MIN: CPT

## 2023-03-06 PROCEDURE — 83735 ASSAY OF MAGNESIUM: CPT

## 2023-03-06 PROCEDURE — 97530 THERAPEUTIC ACTIVITIES: CPT

## 2023-03-06 PROCEDURE — 84100 ASSAY OF PHOSPHORUS: CPT

## 2023-03-06 PROCEDURE — 80053 COMPREHEN METABOLIC PANEL: CPT

## 2023-03-06 PROCEDURE — 36415 COLL VENOUS BLD VENIPUNCTURE: CPT

## 2023-03-06 PROCEDURE — 74011250637 HC RX REV CODE- 250/637: Performed by: HOSPITALIST

## 2023-03-06 PROCEDURE — 74011000258 HC RX REV CODE- 258: Performed by: FAMILY MEDICINE

## 2023-03-06 PROCEDURE — 74011250636 HC RX REV CODE- 250/636: Performed by: HOSPITALIST

## 2023-03-06 PROCEDURE — 74011000258 HC RX REV CODE- 258: Performed by: HOSPITALIST

## 2023-03-06 PROCEDURE — 74011000250 HC RX REV CODE- 250: Performed by: FAMILY MEDICINE

## 2023-03-06 PROCEDURE — 65660000001 HC RM ICU INTERMED STEPDOWN

## 2023-03-06 PROCEDURE — 85025 COMPLETE CBC W/AUTO DIFF WBC: CPT

## 2023-03-06 RX ORDER — DILTIAZEM HYDROCHLORIDE 60 MG/1
60 TABLET, FILM COATED ORAL
Status: DISCONTINUED | OUTPATIENT
Start: 2023-03-06 | End: 2023-03-08 | Stop reason: ALTCHOICE

## 2023-03-06 RX ADMIN — MICONAZOLE NITRATE 2 % TOPICAL POWDER: at 09:00

## 2023-03-06 RX ADMIN — APIXABAN 5 MG: 5 TABLET, FILM COATED ORAL at 08:53

## 2023-03-06 RX ADMIN — MICONAZOLE NITRATE 2 % TOPICAL POWDER: at 18:00

## 2023-03-06 RX ADMIN — CYANOCOBALAMIN TAB 500 MCG 1000 MCG: 500 TAB at 08:53

## 2023-03-06 RX ADMIN — SODIUM CHLORIDE, PRESERVATIVE FREE 10 ML: 5 INJECTION INTRAVENOUS at 21:08

## 2023-03-06 RX ADMIN — ACETAMINOPHEN 650 MG: 325 TABLET ORAL at 04:30

## 2023-03-06 RX ADMIN — ACETAMINOPHEN 650 MG: 325 TABLET ORAL at 11:57

## 2023-03-06 RX ADMIN — METOPROLOL SUCCINATE 100 MG: 50 TABLET, EXTENDED RELEASE ORAL at 08:53

## 2023-03-06 RX ADMIN — CEFEPIME 2 G: 2 INJECTION, POWDER, FOR SOLUTION INTRAVENOUS at 14:25

## 2023-03-06 RX ADMIN — FUROSEMIDE 40 MG: 10 INJECTION, SOLUTION INTRAMUSCULAR; INTRAVENOUS at 08:53

## 2023-03-06 RX ADMIN — APIXABAN 5 MG: 5 TABLET, FILM COATED ORAL at 21:06

## 2023-03-06 RX ADMIN — CEFEPIME 2 G: 2 INJECTION, POWDER, FOR SOLUTION INTRAVENOUS at 04:11

## 2023-03-06 RX ADMIN — VANCOMYCIN HYDROCHLORIDE 1250 MG: 1.25 INJECTION, POWDER, LYOPHILIZED, FOR SOLUTION INTRAVENOUS at 08:53

## 2023-03-06 RX ADMIN — DILTIAZEM HYDROCHLORIDE 12 MG/HR: 5 INJECTION, SOLUTION INTRAVENOUS at 08:13

## 2023-03-06 RX ADMIN — VANCOMYCIN HYDROCHLORIDE 1000 MG: 1 INJECTION, POWDER, LYOPHILIZED, FOR SOLUTION INTRAVENOUS at 21:07

## 2023-03-06 RX ADMIN — DILTIAZEM HYDROCHLORIDE 4 MG/HR: 5 INJECTION, SOLUTION INTRAVENOUS at 14:25

## 2023-03-06 RX ADMIN — DILTIAZEM HYDROCHLORIDE 60 MG: 60 TABLET, FILM COATED ORAL at 16:07

## 2023-03-06 NOTE — CONSULTS
Cardiology Consult Note    CC: leg pain  Reason for consult:  Afib with RVR  Requesting MD:  Dr. Marla Galarza     Subjective:      Date of  Admission: 3/3/2023  9:41 PM     Admission type:Emergency    Magdalena Gruber is a 75 y.o. male admitted for Atrial fibrillation with RVR ([facility].) [I48.91]  Edema of lower extremity [R60.0]  Cellulitis of left lower extremity [L03.116]  Cellulitis of right lower extremity [L03.115]. Patient complains of painful leg swelling and sx of cellulitis. He was found to be in Afib in RVR. He AFib dates back last March when he presented with purulent cellulitis of his legs. He has chronic severe venous stasis and huge legs in setting of morbid obesity. He states he has been compliant to Eliquis since last April. He has had BB/Diltiazem/Digoxin as given by his cardiologist Dr. Jan Babcock. He also carries dx of chronic diastolic HF in past and I do not have his recent echo on this. At least he is not complaining of any SOB/orthopnea now. He does not know of his recent weight. Patient Active Problem List    Diagnosis Date Noted    Atrial fibrillation with RVR ([facility] 03/03/2023    Edema of lower extremity 03/03/2023    Cellulitis of right lower extremity 03/03/2023    Cellulitis of left lower extremity 03/03/2023      Yang Bermudez MD  No past medical history on file. No past surgical history on file. No Known Allergies   No family history on file. Current Facility-Administered Medications   Medication Dose Route Frequency    vancomycin (VANCOCIN) 1,000 mg in 0.9% sodium chloride 250 mL ([id number])  1,000 mg IntraVENous Q12H    Vancomycin random due 3/6 with am labs. Thanks!    Other ONCE    apixaban (ELIQUIS) tablet 5 mg  5 mg Oral Q12H    ergocalciferol capsule 50,000 Units  50,000 Units Oral every Saturday    metoprolol succinate (TOPROL-XL) XL tablet 100 mg  100 mg Oral DAILY    cyanocobalamin (VITAMIN B12) tablet 1,000 mcg  1,000 mcg Oral DAILY    glucose chewable tablet 16 g  4 Tablet Oral PRN    glucagon (GLUCAGEN) injection 1 mg  1 mg IntraMUSCular PRN    dextrose 10 % infusion 0-250 mL  0-250 mL IntraVENous PRN    insulin lispro (HUMALOG) injection   SubCUTAneous AC&HS    sodium chloride (NS) flush 5-40 mL  5-40 mL IntraVENous Q8H    sodium chloride (NS) flush 5-40 mL  5-40 mL IntraVENous PRN    acetaminophen (TYLENOL) tablet 650 mg  650 mg Oral Q6H PRN    Or    acetaminophen (TYLENOL) suppository 650 mg  650 mg Rectal Q6H PRN    polyethylene glycol (MIRALAX) packet 17 g  17 g Oral DAILY PRN    ondansetron (ZOFRAN ODT) tablet 4 mg  4 mg Oral Q8H PRN    Or    ondansetron (ZOFRAN) injection 4 mg  4 mg IntraVENous Q6H PRN    dilTIAZem (CARDIZEM) 125 mg in dextrose 5% 125 mL infusion  0-15 mg/hr IntraVENous TITRATE    morphine injection 4 mg  4 mg IntraVENous Q4H PRN    vancomycin - pharmacy to dose   Other Rx Dosing/Monitoring    cefepime (MAXIPIME) 2 g in 0.9% sodium chloride (MBP/ADV) 100 mL MBP  2 g IntraVENous Q12H    furosemide (LASIX) injection 40 mg  40 mg IntraVENous DAILY    miconazole (MICOTIN) 2 % powder   Topical BID        Prior to Admission Medications:  Prior to Admission medications    Medication Sig Start Date End Date Taking? Authorizing Provider   Eliquis 5 mg tablet Take 5 mg by mouth two (2) times a day. 2/6/23  Yes Theron, MD Rafael   Vitamin B-12 1,000 mcg tablet Take 1,000 mcg by mouth daily. 2/6/23  Yes Theron, MD Rafael   ergocalciferol (ERGOCALCIFEROL) 1,250 mcg (50,000 unit) capsule TAKE 1 CAPSULE BY MOUTH ONE TIME PER WEEK 12/9/22  Yes Rafael Crump MD   metoprolol succinate (TOPROL-XL) 100 mg tablet Take 100 mg by mouth daily. 1/7/23  Yes Rafael Crump MD   metFORMIN ER (GLUCOPHAGE XR) 500 mg tablet Take 1,000 mg by mouth two (2) times a day. 12/9/22  Yes Rafael Crump MD   dilTIAZem ER (TIAZAC) 180 mg capsule Take 180 mg by mouth daily.  Has not taken since February 20th    Rafael Crump MD        Review of Symptoms:  Except as noted in HPI, patient denies recent fever or chills, nausea, vomiting, diarrhea, hemoptysis, hematemesis, dysuria, myalgias, focal neurologic symptoms, ecchymosis, angioedema, odynophagia, dysphagia, sore throat, earache,rash, melena, hematochezia, depression, GERD, cold intolerance, petechia, bleeding gums, or significant weight loss. Review of systems not obtained due to patient factors. Subjective:    24 hr VS reviewed, overall VSSAF  Temp (24hrs), Av.4 °F (36.9 °C), Min:97.5 °F (36.4 °C), Max:98.7 °F (37.1 °C)    Patient Vitals for the past 8 hrs:   Pulse   23 1500 80   23 1400 80   23 1200 94   23 1143 89   23 1000 87   23 0907 (!) 114   23 0851 (!) 109   23 0800 (!) 105    Patient Vitals for the past 8 hrs:   Resp   23 1500 15   23 1143 22   23 0851 17    Patient Vitals for the past 8 hrs:   BP   23 1500 107/74   23 1143 103/69   23 0907 109/70   23 0851 121/78          Intake/Output Summary (Last 24 hours) at 3/6/2023 1529  Last data filed at 3/6/2023 1042  Gross per 24 hour   Intake 500 ml   Output 2250 ml   Net -1750 ml         Physical Exam (complete single organ system exam)    Visit Vitals  /74 (BP 1 Location: Right upper arm, BP Patient Position: Sitting)   Pulse 80   Temp 97.5 °F (36.4 °C)   Resp 15   Ht 5' 10\" (1.778 m)   Wt 139.9 kg (308 lb 6.8 oz)   SpO2 98%   BMI 44.25 kg/m²     General Appearance:  Well developed, well nourished,alert and oriented x 3, and individual in no acute distress. Ears/Nose/Mouth/Throat:   Hearing grossly normal.         Neck: Supple. Chest:   Lungs with rales at bases   Cardiovascular:  irregular rate and rhythm, S1, S2 normal, no murmur. Abdomen:   Soft, non-tender, bowel sounds are active. Extremities: 3+ edema bilaterally. Skin: Warm and dry.                Cardiographics    Telemetry: AFIB  ECG: atrial fibrillation, rate 100  Echocardiogram: Not done    Labs:   Recent Results (from the past 24 hour(s))   GLUCOSE, POC    Collection Time: 03/05/23  4:15 PM   Result Value Ref Range    Glucose (POC) 116 65 - 117 mg/dL    Performed by Tran Miller    GLUCOSE, POC    Collection Time: 03/05/23 10:20 PM   Result Value Ref Range    Glucose (POC) 113 65 - 117 mg/dL    Performed by Ondina Levine RN    METABOLIC PANEL, COMPREHENSIVE    Collection Time: 03/06/23  5:14 AM   Result Value Ref Range    Sodium 140 136 - 145 mmol/L    Potassium 3.6 3.5 - 5.1 mmol/L    Chloride 104 97 - 108 mmol/L    CO2 31 21 - 32 mmol/L    Anion gap 5 5 - 15 mmol/L    Glucose 100 65 - 100 mg/dL    BUN 12 6 - 20 MG/DL    Creatinine 0.83 0.70 - 1.30 MG/DL    BUN/Creatinine ratio 14 12 - 20      eGFR >60 >60 ml/min/1.73m2    Calcium 8.5 8.5 - 10.1 MG/DL    Bilirubin, total 0.5 0.2 - 1.0 MG/DL    ALT (SGPT) 14 12 - 78 U/L    AST (SGOT) 14 (L) 15 - 37 U/L    Alk. phosphatase 74 45 - 117 U/L    Protein, total 6.6 6.4 - 8.2 g/dL    Albumin 2.4 (L) 3.5 - 5.0 g/dL    Globulin 4.2 (H) 2.0 - 4.0 g/dL    A-G Ratio 0.6 (L) 1.1 - 2.2     VANCOMYCIN, RANDOM    Collection Time: 03/06/23  5:14 AM   Result Value Ref Range    Vancomycin, random 21.0 UG/ML   CBC WITH AUTOMATED DIFF    Collection Time: 03/06/23  5:14 AM   Result Value Ref Range    WBC 9.4 4.1 - 11.1 K/uL    RBC 4.00 (L) 4.10 - 5.70 M/uL    HGB 12.2 12.1 - 17.0 g/dL    HCT 39.3 36.6 - 50.3 %    MCV 98.3 80.0 - 99.0 FL    MCH 30.5 26.0 - 34.0 PG    MCHC 31.0 30.0 - 36.5 g/dL    RDW 14.6 (H) 11.5 - 14.5 %    PLATELET 618 415 - 510 K/uL    MPV 11.6 8.9 - 12.9 FL    NRBC 0.0 0  WBC    ABSOLUTE NRBC 0.00 0.00 - 0.01 K/uL    NEUTROPHILS 65 32 - 75 %    LYMPHOCYTES 18 12 - 49 %    MONOCYTES 12 5 - 13 %    EOSINOPHILS 4 0 - 7 %    BASOPHILS 1 0 - 1 %    IMMATURE GRANULOCYTES 0 0.0 - 0.5 %    ABS. NEUTROPHILS 6.3 1.8 - 8.0 K/UL    ABS. LYMPHOCYTES 1.7 0.8 - 3.5 K/UL    ABS. MONOCYTES 1.1 (H) 0.0 - 1.0 K/UL    ABS. EOSINOPHILS 0.3 0.0 - 0.4 K/UL    ABS.  BASOPHILS 0.1 0.0 - 0.1 K/UL    ABS. IMM.  GRANS. 0.0 0.00 - 0.04 K/UL    DF AUTOMATED     MAGNESIUM    Collection Time: 03/06/23  5:14 AM   Result Value Ref Range    Magnesium 1.9 1.6 - 2.4 mg/dL   PHOSPHORUS    Collection Time: 03/06/23  5:14 AM   Result Value Ref Range    Phosphorus 3.1 2.6 - 4.7 MG/DL   GLUCOSE, POC    Collection Time: 03/06/23  8:41 AM   Result Value Ref Range    Glucose (POC) 109 65 - 117 mg/dL    Performed by [employer]  PCT    GLUCOSE, POC    Collection Time: 03/06/23 12:50 PM   Result Value Ref Range    Glucose (POC) 116 65 - 117 mg/dL    Performed by [employer]  PCT         Assessment:     Assessment:   Afib; persistent and is on therapeutic AC  HT; stable  Chronic diastolic HF; compensated at this point  Cellulitis; recurrent  Morbid obesity with probable COPD and LILIANA      Plan:   Tele  Agree with current regimen; Metoprolol is main stay of regimen  Continue AC  Follow up with Dr. Janet Gray on discharge    King Sandoval MD

## 2023-03-06 NOTE — PROGRESS NOTES
Pharmacist Note - Vancomycin Dosing  Therapy day 3  Indication: Bilateral LE cellulitis  Current regimen: 1250 mg IV Q 12 hours    Recent Labs     03/06/23  0514 03/05/23  0322 03/05/23  0317 03/04/23  0410   WBC 9.4 10.0  --  12.0*   CREA 0.83  --  0.74 0.78   BUN 12  --  10 12       A random vancomycin level of 21 mcg/mL was obtained and from this level, the patient's AUC24 is calculated to be 566 with the current regimen. Goal target range AUC/MOIZ 400-500      Plan: Change to 1 gram IV Q 12 hours . Pharmacy will continue to monitor this patient daily for changes in clinical status and renal function. *Random vancomycin levels are used to calculate AUC/MOIZ, this level should not be interpreted as a trough. Vancomycin has been dosed using Bayesian kinetics software to target an AUC24:MOIZ of 400-600, which provides adequate exposure for as assumed infection due to MRSA with an MOIZ of 1 or less while reducing the risk of nephrotoxicity as seen with traditional trough based dosing goals.

## 2023-03-06 NOTE — WOUND CARE
WOCN Note:     New consult placed for assessment of bilateral low legs. Patient reports that Dr Elis Camacho podiatrist manages his wounds. Chart reviewed. Assessed in room 421. Admitted DX: Atrial fibrillation with RVR   Edema of lower extremity  Cellulitis of left lower extremity  Cellulitis of right lower extremity     PMH:  DM2, venous stasis, AFib,    Assessment:   Patient is A&O x 4, communicative, continent and mobile. Bed: prius air mattress  Pre-medicated for pain by RN. Generalized edema and blanching erythema to lower legs and feet. Heels intact without erythema. Sacrum and buttocks intact without erythema. 1. POA Left anterior low leg wound, venous wound with cellulitis:  5 x 4 x 0.1 cm; serous drainage; no odor. 2.  POA Right lateral low leg proximal, venous wound with cellulitis:  7 x 6 x 0.1 cm; serous drainage; no odor. 3.  POA Right lateral low leg distal, venous wound with cellulitis:  5 x 10  x 0.1 cm; serous drainage; no odor. 4.  POA Right posterior low leg, venous wound with cellulitis:  4 x 5 x 0.1 cm; serous drainage; no odor. Wound, Pressure Prevention & Skin Care Recommendations:    Minimize layers of linen/pads under patient to optimize support surface. 2.  Turn/reposition approximately every 2 hours and offload heels. 3.  Manage moisture/ Keep skin folds clean and dry/minimize brief usage. 4.  Specialty bed: Prius air mattress in place. 5.  Right and left low leg wounds:  Daily cleanse with saline; apply Opticel AG (silver dressing); cover with dry gauze, abd pad and roll gauze. 6.  Patient should follow up with Dr Elis Camacho podiatrist upon discharge. Discussed above plan with patient and Tevin RN.     Transition of Care:   Plan to follow as needed while admitted to hospital.    WILBERTO Boss RN [facility]  Available on [employer]  Office [phone]

## 2023-03-06 NOTE — PROGRESS NOTES
Physician Progress Note      PATIENT:               Roxanne Freeman  CSN #:                  [id number]  :                       1955  ADMIT DATE:       3/3/2023 9:41 PM  [address] [place] DATE:  RESPONDING  PROVIDER #:        Nash GONSALES MD          QUERY TEXT:    Dear Attending,    Pt admitted with bilateral LE cellulitis. Pt noted to have DM 2. If possible, please document in progress notes and discharge summary the relationship, if any, between cellulitis and DM. The medical record reflects the following:  Risk Factors: DM 2, Chronic venous stasis  Clinical Indicators: bilateral LE edema, Multiple wounds, Xray shows Bilateral lower leg soft tissue edema. Minimal periosteal reaction along the distal left tibial and fibular diaphyses can be seen with stress reaction or  infection. No bony erosion. -->118-->188  Treatment: IV Vanco, added iv cefepime due to the extensive imvolvement 3/4, Keep legs elevated at rest, blood cultures, Wound Care consulted, Humalog insulin correctional coverage, scheduled blood glucose checks and check hemoglobin A1c level. Please call if you have any questions or need assistance. I can also be reached via [employer] [facility] or [facility] # [phone]. Thank you,  Christine Ann, [facility]  O: [phone]  Options provided:  -- bilateral LE cellulitis associated with Diabetes  -- bilateral LE cellulitis unrelated to Diabetes  -- Other - I will add my own diagnosis  -- Disagree - Not applicable / Not valid  -- Disagree - Clinically unable to determine / Unknown  -- Refer to Clinical Documentation Reviewer    PROVIDER RESPONSE TEXT:    Bilateral le cellulitis associated with chronic edema and ulcer    Query created by:  Danica Alexandre on 3/6/2023 5:44 PM      Electronically signed by:  Nash GONSALES MD 3/6/2023 6:44 PM

## 2023-03-06 NOTE — PROGRESS NOTES
Hospitalist Progress Note  Andrey Stanley MD  Answering service: [phone] from in house phone        Date of Service:  3/6/2023  NAME:  Vane Isabel  :    MRN:  [id number]      Admission Summary:   Vane Isabel is a 75 y.o. male past medical history of atrial fibrillation, long-term anticoagulation therapy on Eliquis, lower extremity edema, cellulitis, hypertension, type 2 diabetes mellitus, scrotal hernia, and obesity presented to the emergency department via EMS from home with chief complaints of unhealed wounds. Patient has multiple wounds of bilateral lower extremities with history of chronic venous stasis. Per reports patient's bilateral lower extremity wounds have been weeping with pus over the last 7 days with increased swelling edema both legs to severe, constant, without specific alleviating factors. There is no reports of fever. On arrival emergency department, initial reported vital signs temperature 98.4 °F, /105, heart rate 113, respiratory rate 20, O2 saturations 99% on room air. Left and right tib-fib fib x-ray showed bilateral lower extremity edema, minimal periosteal reaction along tibia and fibular diaphysis questionable for stress reaction or infection. 12 lead EKG showed atrial fibrillation with rapid ventricular response at 107 bpm.  ED administered diltiazem 20 mg IV x1 dose, Tylenol 650 mg p.o. x1, vancomycin 2500 mg IV x1 dose. Patient is now seen for admission to the hospitalist service. Patient complains of severe pain associated with leg swelling and edema. Despite the same, he notes he is ambulatory without assistance. In addition, he has scrotal open wounds which she notes are chronic and had hernia associated with scrotal swelling for several years. Interval history / Subjective:   Leg swelling better      Assessment & Plan:      1.   Atrial fibrillation with RVR   -monitor  IMCU  -continue Diltiazem titrated IV infusion  -Goal heart rate less than 100  -Continuous cardiac monitoring  -Consult cardiologist  -Continue  Eliquis  Echo pending      2. Edema of bilateral lower extremities  -Chronic venous stasis  -proBNP 1513  -Keep legs elevated at rest  -Placed on strict I's and O's and daily weights  - Lasix 40 mg IV daily, continue as long renal funciton tolerate      3. Cellulitis of right lower extremity        Cellulitis of left lower extremity   -Continue vancomycin pharmacy dosing  -Check paired blood cultures  -added iv cefepime due to the extensive imvolvement 3/4      4. Multiple wounds  -Open wounds right distal leg and scrotum  -Continue wound cares and dressing changes  -Consult care team     5. Type 2 diabetes mellitus  -Order Humalog insulin correctional coverage, scheduled blood glucose checks and check hemoglobin A1c level. 6.  Essential hypertension  -Continue metoprolol     7. Class III obesity  -BMI 37.31 kg/M2  -Would encourage weight loss, reduced calorie diet, lifestyle modifications    8. Suprapubic catheter           Code status: full   Prophylaxis: eliquis   Care Plan discussed with: pt, rn   Anticipated Disposition: tbd , pt, ot eval, pt was living with a roommate prior, may return back to [facility] Problems  Never Reviewed            Codes Class Noted POA    Atrial fibrillation with RVR ([facility] ICD-10-CM: I48.91  ICD-9-CM: 427.31  3/3/2023 Unknown        Edema of lower extremity ICD-10-CM: R60.0  ICD-9-CM: 782.3  3/3/2023 Unknown        Cellulitis of right lower extremity ICD-10-CM: L03.115  ICD-9-CM: 682.6  3/3/2023 Unknown        Cellulitis of left lower extremity ICD-10-CM: L03.116  ICD-9-CM: 682.6  3/3/2023 Unknown           Review of Systems:   A comprehensive review of systems was negative except for that written in the HPI. Vital Signs:    Last 24hrs VS reviewed since prior progress note.  Most recent are:  Visit Vitals  /69 (BP 1 Location: Left upper arm, BP Patient Position: Sitting)   Pulse 94   Temp 98.7 °F (37.1 °C)   Resp 22   Ht 5' 10\" (1.778 m)   Wt 139.9 kg (308 lb 6.8 oz)   SpO2 96%   BMI 44.25 kg/m²         Intake/Output Summary (Last 24 hours) at 3/6/2023 1406  Last data filed at 3/6/2023 1042  Gross per 24 hour   Intake 500 ml   Output 2250 ml   Net -1750 ml          Physical Examination:     I had a face to face encounter with this patient and independently examined them on 3/6/2023 as outlined below:          General : looks confused, unsure baseline mental status   HEENT: PEERL, EOMI, moist mucus membrane, TM clear  Neck: supple, no JVD, no meningeal signs  Chest: Clear to auscultation bilaterally   CVS: S1 S2 heard, Capillary refill less than 2 seconds  Abd: soft, large inguinal hernia, suprapuic catheter, mild red groin folded shon   Ext:Extensive, severe diffuse erythema, tenderness, swelling of both legs with large open stage II wound on the posterior aspect distal right leg  Stage II large open wound on scrotum    Neuro/Psych:no focal weakness. Skin: warm            Data Review:    Review and/or order of clinical lab test    I have independently reviewed and interpreted patient's lab and all other diagnostic data    Notes reviewed from all clinical/nonclinical/nursing services involved in patient's clinical care. Care coordination discussions were held with appropriate clinical/nonclinical/ nursing providers based on care coordination needs.      Labs:     Recent Labs     03/06/23  0514 03/05/23  0322   WBC 9.4 10.0   HGB 12.2 12.9   HCT 39.3 41.2    168       Recent Labs     03/06/23  0514 03/05/23  0322 03/05/23  0317 03/04/23  0410     --  139 141   K 3.6  --  4.1 4.2     --  105 106   CO2 31  --  27 26   BUN 12  --  10 12   CREA 0.83  --  0.74 0.78     --  95 90   CA 8.5  --  8.3* 8.3*   MG 1.9 1.8  --  1.9   PHOS 3.1  --   --   --        Recent Labs 03/06/23  0514 03/05/23 0317 03/04/23  0410   ALT 14 17 18   AP 74 75 82   TBILI 0.5 0.6 0.4   TP 6.6 6.4 6.6   ALB 2.4* 2.6* 2.8*   GLOB 4.2* 3.8 3.8       Recent Labs     03/05/23  0349 03/04/23  0410   INR 1.1  --    PTP 11.4*  --    APTT 28.9 25.8        No results for input(s): FE, TIBC, PSAT, FERR in the last 72 hours. No results found for: FOL, RBCF   No results for input(s): PH, PCO2, PO2 in the last 72 hours. No results for input(s): CPK, CKNDX, TROIQ in the last 72 hours. No lab exists for component: CPKMB  No results found for: CHOL, CHOLX, CHLST, CHOLV, HDL, HDLP, LDL, LDLC, DLDLP, TGLX, TRIGL, TRIGP, CHHD, CHHDX  Lab Results   Component Value Date/Time    Glucose (POC) 116 03/06/2023 12:50 PM    Glucose (POC) 109 03/06/2023 08:41 AM    Glucose (POC) 113 03/05/2023 10:20 PM    Glucose (POC) 116 03/05/2023 04:15 PM    Glucose (POC) 129 (H) 03/05/2023 12:30 PM     No results found for: COLOR, APPRN, SPGRU, REFSG, HUBER, PROTU, GLUCU, KETU, BILU, UROU, SHARON, LEUKU, GLUKE, EPSU, BACTU, WBCU, RBCU, CASTS, UCRY      Medications Reviewed:     Current Facility-Administered Medications   Medication Dose Route Frequency    vancomycin (VANCOCIN) 1,000 mg in 0.9% sodium chloride 250 mL ([id number])  1,000 mg IntraVENous Q12H    Vancomycin random due 3/6 with am labs. Thanks!    Other ONCE    apixaban (ELIQUIS) tablet 5 mg  5 mg Oral Q12H    ergocalciferol capsule 50,000 Units  50,000 Units Oral every Saturday    metoprolol succinate (TOPROL-XL) XL tablet 100 mg  100 mg Oral DAILY    cyanocobalamin (VITAMIN B12) tablet 1,000 mcg  1,000 mcg Oral DAILY    glucose chewable tablet 16 g  4 Tablet Oral PRN    glucagon (GLUCAGEN) injection 1 mg  1 mg IntraMUSCular PRN    dextrose 10 % infusion 0-250 mL  0-250 mL IntraVENous PRN    insulin lispro (HUMALOG) injection   SubCUTAneous AC&HS    sodium chloride (NS) flush 5-40 mL  5-40 mL IntraVENous Q8H    sodium chloride (NS) flush 5-40 mL  5-40 mL IntraVENous PRN acetaminophen (TYLENOL) tablet 650 mg  650 mg Oral Q6H PRN    Or    acetaminophen (TYLENOL) suppository 650 mg  650 mg Rectal Q6H PRN    polyethylene glycol (MIRALAX) packet 17 g  17 g Oral DAILY PRN    ondansetron (ZOFRAN ODT) tablet 4 mg  4 mg Oral Q8H PRN    Or    ondansetron (ZOFRAN) injection 4 mg  4 mg IntraVENous Q6H PRN    dilTIAZem (CARDIZEM) 125 mg in dextrose 5% 125 mL infusion  0-15 mg/hr IntraVENous TITRATE    morphine injection 4 mg  4 mg IntraVENous Q4H PRN    vancomycin - pharmacy to dose   Other Rx Dosing/Monitoring    cefepime (MAXIPIME) 2 g in 0.9% sodium chloride (MBP/ADV) 100 mL MBP  2 g IntraVENous Q12H    furosemide (LASIX) injection 40 mg  40 mg IntraVENous DAILY    miconazole (MICOTIN) 2 % powder   Topical BID     ______________________________________________________________________  EXPECTED LENGTH OF STAY: - - -  ACTUAL LENGTH OF STAY:          3                 Hans Cruz MD

## 2023-03-06 NOTE — PROGRESS NOTES
Transition of Care: hopefully home with home health PT/OT/SN; pending referrals sent to [facility], [facility]: possibly in car; patient has medicaid and is normally ambulatory    RUR: 11%    DX: bilateral cellulitis    Main contact:     Discharge pending:   -pending echo (to be done 3/6)  -pending medical progress and care recommendations    1500: this CM noted home health order; met with patient at bedside; he is fine with any [address]; referrals sent to [facility], [address] via [address]:     The Plan for Transition of Care is related to the following treatment goals: [facility]    The Patient   was provided with a choice of provider and agrees  with the discharge plan. Yes [x] No []    A Freedom of choice list was provided with basic dialogue that supports the patient's individualized plan of care/goals and shares the quality data associated with the providers. Yes [x] No []     CM following  Leticia العلي RN    NOTE:. Patient is alert and oriented x4. Demographics confirmed. Patient resides in a senior living apartment with a roommate. There are no steps to enter as there are elevators. No DME. Patient is independent in ADLs and ambulation. He does have a 's license, but no personal vehicle. Has access to Medicaid transportation. Hx: afib on Eliquis, HTN, diabetes type II, obesity. PCP confirmed and pharmacy used [facility] located at [address] [place] [address] [facility] [place] phone: [phone].

## 2023-03-06 NOTE — PROGRESS NOTES
2000: Bedside and Verbal shift change report given to Elias Chavez RN (oncoming nurse) by Scarlett Bah (offgoing nurse). Report included the following information SBAR, Kardex, ED Summary, Intake/Output, MAR, Recent Results, and Cardiac Rhythm Afib .

## 2023-03-06 NOTE — PROGRESS NOTES
Problem: Self Care Deficits Care Plan (Adult)  Goal: *Acute Goals and Plan of Care (Insert Text)  Description:   FUNCTIONAL STATUS PRIOR TO ADMISSION: Patient was independent and active without use of DME. Able to complete ADL/iADL independently, relied on the grocery cart for support. Currently sleeping in recliner or chairs d/t sinus problem interrupting quality of sleep. HOME SUPPORT: Lives with roommates, one is in the hospital at this time    Occupational Therapy Goals  Initiated 3/6/2023  1. Patient will perform grooming with modified independence within 7 day(s). 2.  Patient will perform upper body dressing with supervision/set-up within 7 day(s). 3.  Patient will perform lower body dressing with moderate assistance  within 7 day(s). 4.  Patient will perform all aspects of toileting with supervision/set-up within 7 day(s). 5.  Patient will utilize energy conservation techniques during functional activities with verbal cues within 7 day(s). Outcome: Progressing Towards Goal   OCCUPATIONAL THERAPY EVALUATION  Patient: Genoveva Chawla (20 y.o. male)  Date: 3/6/2023  Primary Diagnosis: Atrial fibrillation with RVR ([facility]) [I48.91]  Edema of lower extremity [R60.0]  Cellulitis of left lower extremity [L03.116]  Cellulitis of right lower extremity [L03.115]       Precautions:  Fall, Skin    ASSESSMENT  Based on the objective data described below, the patient presents with impaired balance, strength, and activity tolerance impacting ability to complete ADL/IADL at baseline. Patient received reclined in bed, amenable to session. Currently limited by LE pain and edema from cellulitis. At baseline, patient is fairly independent, able to complete ADL/IADL himself. Currently requiring x1 assist for mobility d/t LE impairments. Transferred to bedside chair with overall SBA. Completed grooming tasks seated in chair, VSS throughout. Provided waffle cushion to support BLE in recliner.  Anticipate as pain resolves, will progress and discharge home with HH vs no needs. Patient left reclined in bed, all needs in reach, NAD. Current Level of Function Impacting Discharge (ADLs/self-care): x1 assist for line management and BLE assist for bed mobility    Functional Outcome Measure: The patient scored 19/24 on the [facility] functional outcome measure. A score of ?191,2,3 had higher odds of discharging home with [facility] or needs of SNF/IPR       Other factors to consider for discharge: below baseline, limited by pain/edema     Patient will benefit from skilled therapy intervention to address the above noted impairments. PLAN :  Recommendations and Planned Interventions: self care training, functional mobility training, therapeutic exercise, balance training, therapeutic activities, endurance activities, patient education, home safety training, and family training/education    Frequency/Duration: Patient will be followed by occupational therapy 4 times a week to address goals. Recommendation for discharge: (in order for the patient to meet his/her long term goals)  Occupational therapy at least 2 days/week in the home     This discharge recommendation:  Has not yet been discussed the attending provider and/or case management    IF patient discharges home will need the following DME: TBD pending progress       SUBJECTIVE:   Patient stated I wouldn't wish this on my worst enemy.  re: BLE cellulitis     OBJECTIVE DATA SUMMARY:   HISTORY:   No past medical history on file. No past surgical history on file.     Expanded or extensive additional review of patient history:     Home Situation  Home Environment: Apartment  # Steps to Enter: 0  One/Two Story Residence: Two story  # of Interior Steps: 13  Interior Rails: Both  Living Alone: No  Support Systems:  (roommates)  Patient Expects to be Discharged to[de-identified] Home  Current DME Used/Available at Home: Walker, rolling, Wheelchair, Shower chair, Grab bars  Tub or Shower Type: Tub/Shower combination    Hand dominance: Right    EXAMINATION OF PERFORMANCE DEFICITS:  Cognitive/Behavioral Status:  Neurologic State: Alert  Orientation Level: Oriented X4  Cognition: Appropriate decision making; Appropriate safety awareness; Follows commands  Perception: Appears intact  Perseveration: No perseveration noted  Safety/Judgement: Awareness of environment; Fall prevention; Insight into deficits    Skin: BLE wounds from cellulitis     Edema: BLE, scrotum    Hearing: Auditory  Auditory Impairment: None    Vision/Perceptual:                           Acuity: Impaired far vision    Corrective Lenses: Glasses    Range of Motion:  AROM: Generally decreased, functional  PROM: Generally decreased, functional                      Strength:  Strength: Generally decreased, functional                Coordination:  Coordination: Generally decreased, functional  Fine Motor Skills-Upper: Left Intact; Right Intact    Gross Motor Skills-Upper: Left Impaired;Right Impaired    Tone & Sensation:  Tone: Normal  Sensation: Impaired (intermittent peripheral neuropathy BLE baseline)                      Balance:  Sitting: Intact  Standing: Impaired  Standing - Static: Good  Standing - Dynamic : Constant support;Good    Functional Mobility and Transfers for ADLs:  Bed Mobility:  Supine to Sit: Minimum assistance;Assist x1    Transfers:  Sit to Stand: Contact guard assistance  Stand to Sit: Contact guard assistance  Bed to Chair: Contact guard assistance    ADL Assessment:  Feeding: Supervision    Oral Facial Hygiene/Grooming: Supervision    Bathing: Contact guard assistance    Upper Body Dressing: Minimum assistance    Lower Body Dressing: Total assistance    Toileting: Minimum assistance                ADL Intervention and task modifications:       Grooming  Grooming Assistance: Set-up; Supervision  Position Performed: Seated in chair  Washing Face: Set-up; Supervision  Brushing/Combing Hair: Set-up  Cues: Verbal cues provided Lower Body Dressing Assistance  Socks: Total assistance (dependent)  Position Performed: Supine  Cues: Verbal cues provided         Cognitive Retraining  Safety/Judgement: Awareness of environment; Fall prevention; Insight into deficits    Functional Measure:  [place] AM-PAC®      Daily Activity Inpatient Short Form (6-Clicks) Version 2  How much HELP from another person do you currently need. .. (If the patient hasn't done an activity recently, how much help from another person do you think they would need if they tried?) Total A Lot A Little None   1. Putting on and taking off regular lower body clothing? [x]   1 []   2 []   3 []   4   2. Bathing (including washing, rinsing, drying)? []   1 []   2 [x]   3 []   4   3. Toileting, which includes using toilet, bedpan, or urinal? []   1 []   2 [x]   3 []   4   4. Putting on and taking off regular upper body clothing? []   1 []   2 []   3 [x]   4   5. Taking care of personal grooming such as brushing teeth? []   1 []   2 []   3 [x]   4   6. Eating meals? []   1 []   2 []   3 [x]   4     Raw Score: 19/24                            Cutoff score ?191,2,3 had higher odds of discharging home with home health or need of SNF/IPR    1. Georgia Hearn. Validity of the AM-PAC 6-Clicks Inpatient Daily Activity and Basic Mobility Short Forms. Physical Therapy Mar 2014, 94 (3) 379-391; DOI: 10.2522/ptj.78226747  2. Travis Latin. Association of AM-PAC \"6-Clicks\" Basic Mobility and Daily Activity Scores With Discharge Destination. Phys Ther. 2021 Apr 4;101(4):viuu106. doi: 10.1093/ptj/kvee817. PMID: 55815884. V Simi Thompson, Patrizia PAVON, Dayne Manjarrez, Josue K, Lorrie S.  Activity Measure for Post-Acute Care \"6-Clicks\" Basic Mobility Scores Predict Discharge Destination After Acute Care Hospitalization in Select Patient Groups: A Retrospective, Observational Study. Arch Rehabil Res Clin Transl. 2022 Jul 16;4(3):734912. doi: 10.1016/j.arrct. 0401.039517. PMID: 82105895; PMCID: BLE5274067. 4. Monse Nash Ni P. AM-PAC Short Forms Manual 4.0. Revised 2/2020. Occupational Therapy Evaluation Charge Determination   History Examination Decision-Making   MEDIUM Complexity : Expanded review of history including physical, cognitive and psychosocial  history  LOW Complexity : 1-3 performance deficits relating to physical, cognitive , or psychosocial skils that result in activity limitations and / or participation restrictions  MEDIUM Complexity : Patient may present with comorbidities that affect occupational performnce. Miniml to moderate modification of tasks or assistance (eg, physical or verbal ) with assesment(s) is necessary to enable patient to complete evaluation       Based on the above components, the patient evaluation is determined to be of the following complexity level: LOW   Pain Rating:  BLE with movement    Activity Tolerance:   Fair and requires rest breaks    After treatment patient left in no apparent distress:    Sitting in chair, Heels elevated for pressure relief, and Call bell within reach    COMMUNICATION/EDUCATION:   The patients plan of care was discussed with: Physical therapist and Registered nurse. Home safety education was provided and the patient/caregiver indicated understanding., Patient/family have participated as able in goal setting and plan of care. , and Patient/family agree to work toward stated goals and plan of care. This patients plan of care is appropriate for delegation to [facility].     Thank you for this referral.  Regina Stout OT

## 2023-03-06 NOTE — PROGRESS NOTES
Vitals:      03/06/23 0851 03/06/23 0907   BP:   121/78 109/70   BP 1 Location:   Right upper arm Right upper arm   BP Patient Position:   At rest;Semi fowlers Sitting  Comment: up to the chair   Pulse:   (!) 109 (!) 114   Temp:       Resp:   17    Height:       Weight:       SpO2:room air   95% 91%        Orders received, chart reviewed and patient evaluated by physical therapy. Pending progression with skilled acute physical therapy, recommend:  No skilled physical therapy/ follow up rehabilitation needs identified at this time, pending progress vs HHPT. Recommend with nursing OOB to chair 3x/day and walking daily with contact guard assist and  gait belt, recommend assist X 2 initially . Thank you for completing as able in order to maintain patient strength, endurance and independence. Full evaluation to follow.   Tom Weaver, PT 16

## 2023-03-06 NOTE — PROGRESS NOTES
Problem: Mobility Impaired (Adult and Pediatric)  Goal: *Acute Goals and Plan of Care (Insert Text)  Description: FUNCTIONAL STATUS PRIOR TO ADMISSION: Patient was independent and active without use of DME. He reported that he sleeps in a chair and was unable to tell me when the last time was that he slept in a bed. He denied any falls in the last 12 months when asked    [address]: The patient lived with roommates but did not require assist.. Physical Therapy Goals  Initiated 3/6/2023  1. Patient will move from supine to sit and sit to supine , scoot up and down, and roll side to side in bed with independence within 7 day(s). 2.  Patient will transfer from bed to chair and chair to bed with independence using the least restrictive device within 7 day(s). 3.  Patient will perform sit to stand with independence within 7 day(s). 4.  Patient will ambulate with independence for 150 feet with the least restrictive device within 7 day(s). 5.  Patient will ascend/descend 13 stairs with one handrail(s) with modified independence within 7 day(s). Outcome: Progressing Towards Goal   PHYSICAL THERAPY EVALUATION  Patient: Isaías Kinney (23 y.o. male)  Date: 3/6/2023  Primary Diagnosis: Atrial fibrillation with RVR ([facility]) [I48.91]  Edema of lower extremity [R60.0]  Cellulitis of left lower extremity [L03.116]  Cellulitis of right lower extremity [L03.115]       Precautions:   Fall, Skin (bariatric equipment needs)    ASSESSMENT  Based on the objective data described below, the patient presents with significant LE edema and wounds (see wound ostomy note). Despite above and a BMI of 44.25, pt is moving well. He was able to step over to a chair and remain up to the chair post session. He was provided with 2 waffle cushions to rest under his legs when he is up to the recliner. He was admitted with cellulitis of both legs.  Anticipate steady gains and return to his home at time of discharge. Vitals:         03/06/23 0851 03/06/23 0907   BP:     121/78 109/70   BP 1 Location:     Right upper arm Right upper arm   BP Patient Position:     At rest;Semi fowlers Sitting  Comment: up to the chair   Pulse:     (!) 109 (!) 114   Temp:           Resp:     17     Height:           Weight:           SpO2:room air     95% 91%       Current Level of Function Impacting Discharge (mobility/balance): provided up to contact guard. Functional Outcome Measure: The patient scored 19/24 on the AM-PAC outcome measure which is indicative of Cutoff score ?171,2,3 had higher odds of discharging home with home health or need of SNF/IPR. Thomes Socks Other factors to consider for discharge: 13 stairs to access his home, DM     Patient will benefit from skilled therapy intervention to address the above noted impairments. PLAN :  Recommendations and Planned Interventions: bed mobility training, transfer training, gait training, therapeutic exercises, edema management/control, patient and family training/education, and therapeutic activities      Frequency/Duration: Patient will be followed by physical therapy:  5 times a week to address goals. Recommendation for discharge: (in order for the patient to meet his/her long term goals)  Physical therapy at least 2 days/week in the home vs none pending progress    This discharge recommendation:  A follow-up discussion with the attending provider and/or case management is planned    IF patient discharges home will need the following DME: none         SUBJECTIVE:   Patient stated I sleep in a chair.     OBJECTIVE DATA SUMMARY:   HISTORY:    No past medical history on file. No past surgical history on file.   Consult received, chart reviewed, pt cleared by nursing  Personal factors and/or comorbidities impacting plan of care: 13 stairs to access his home, DM    Home Situation  Home Environment: Apartment  # Steps to Enter: 0  One/Two Story Residence: Two story  # of Interior Steps: 15  Interior Rails: Both  Living Alone: No  Support Systems:  (roommates)  Patient Expects to be Discharged to[de-identified] Home with home health  Current DME Used/Available at Home: Walker, rolling, Wheelchair, [address] chair, Grab bars  Tub or Shower Type: Tub/Shower combination    EXAMINATION/PRESENTATION/DECISION MAKING:   Critical Behavior:  Neurologic State: Alert  Orientation Level: Oriented X4  Cognition: Appropriate decision making, Appropriate safety awareness, Follows commands  Safety/Judgement: Awareness of environment, Fall prevention, Insight into deficits  Hearing: Auditory  Auditory Impairment: None  Skin:  wounds bilateral LEs, see MD and nursing notes (especially wound care nursing notes)  Edema: yes, LEs  Range Of Motion:  AROM: Generally decreased, functional           PROM: Generally decreased, functional           Strength:    Strength: Generally decreased, functional                    Tone & Sensation:   Tone: Normal              Sensation: Impaired (intermittent peripheral neuropathy BLE baseline)               Coordination:  Coordination: Generally decreased, functional  Vision:   Acuity: Impaired far vision  Corrective Lenses: Glasses  Functional Mobility:  Bed Mobility:     Supine to Sit: Minimum assistance;Assist x1        Transfers:  Sit to Stand: Contact guard assistance;Assist x2  Stand to Sit: Contact guard assistance;Assist x2        Bed to Chair: Contact guard assistance              Balance:   Sitting: Intact; Without support  Standing: Impaired  Standing - Static: Good;Constant support  Standing - Dynamic : Constant support;Good  Ambulation/Gait Training:  Distance (ft): 3 Feet (ft)  Assistive Device: Gait belt  Ambulation - Level of Assistance: Contact guard assistance;Assist x2        Gait Abnormalities: Decreased step clearance        Base of Support: Widened     Speed/Connie: Slow;Shuffled;Pace decreased (<100 feet/min)  Step Length: Left shortened;Right shortened Stairs: Therapeutic Exercises:       Functional Measure:  [facility] AM-PAC®      Basic Mobility Inpatient Short Form (6-Clicks) Version 2  How much HELP from another person do you currently need. .. (If the patient hasn't done an activity recently, how much help from another person do you think they would need if they tried?) Total A Lot A Little None   1. Turning from your back to your side while in a flat bed without using bedrails? []  1 []  2 [x]  3  []  4   2. Moving from lying on your back to sitting on the side of a flat bed without using bedrails? []  1 []  2 [x]  3  []  4   3. Moving to and from a bed to a chair (including a wheelchair)? []  1 []  2 [x]  3  []  4   4. Standing up from a chair using your arms (e.g. wheelchair or bedside chair)? []  1 []  2 []  3  [x]  4   5. Walking in hospital room? []  1 []  2 [x]  3  []  4   6. Climbing 3-5 steps with a railing? []  1 []  2 [x]  3  []  4     Raw Score: 19/24                            Cutoff score ?171,2,3 had higher odds of discharging home with home health or need of SNF/IPR. [address] [facility], Shy Beltran, Patti Pollack Degree Giovany Lee. Validity of the AM-PAC 6-Clicks Inpatient Daily Activity and Basic Mobility Short Forms. Physical Therapy Mar 2014, 94 (3) 379-391; DOI: 10.2522/ptj.96420989  2. Jermaine Saini. Association of AM-PAC \"6-Clicks\" Basic Mobility and Daily Activity Scores With Discharge Destination. Phys Ther. 2021 Apr 4;101(4):vpwq664. doi: 10.1093/ptj/ucoz458. PMID: 62377389. V Simi Thompson, Patrizia D, Judy Kamara, Josue K, Lorrie S. Activity Measure for Post-Acute Care \"6-Clicks\" Basic Mobility Scores Predict Discharge Destination After Acute Care Hospitalization in Select Patient Groups: A Retrospective, Observational Study. Arch Rehabil Res Clin Transl. 2022 Jul 16;4(3):977785. doi: 10.1016/j.arrct. 4305.247335.  PMID: 55904729; PMCID: [id number]. 4. Negra Hua, Monse WEvita AM-PAC Short Forms Manual 4.0. Revised 2/2020. Physical Therapy Evaluation Charge Determination   History Examination Presentation Decision-Making   HIGH Complexity :3+ comorbidities / personal factors will impact the outcome/ POC  MEDIUM Complexity : 3 Standardized tests and measures addressing body structure, function, activity limitation and / or participation in recreation  LOW Complexity : Stable, uncomplicated  LOW Complexity : FOTO score of       Based on the above components, the patient evaluation is determined to be of the following complexity level: LOW     Pain Rating:  None rated    Activity Tolerance:   Good, SpO2 stable on RA, and see assessment    After treatment patient left in no apparent distress:   Sitting in chair, Call bell within reach, and LEs elevated    COMMUNICATION/EDUCATION:   The patients plan of care was discussed with: Occupational therapist and Registered nurse. Fall prevention education was provided and the patient/caregiver indicated understanding., Patient/family have participated as able in goal setting and plan of care. , and Patient/family agree to work toward stated goals and plan of care.     Thank you for this referral.  Israel Dunbar   Time Calculation: 37 mins

## 2023-03-07 ENCOUNTER — APPOINTMENT (OUTPATIENT)
Dept: NON INVASIVE DIAGNOSTICS | Age: 68
DRG: 602 | End: 2023-03-07
Attending: HOSPITALIST
Payer: MEDICARE

## 2023-03-07 PROBLEM — I50.32 CHRONIC DIASTOLIC HEART FAILURE (HCC): Chronic | Status: ACTIVE | Noted: 2023-03-07

## 2023-03-07 LAB
ALBUMIN SERPL-MCNC: 2.4 G/DL (ref 3.5–5)
ALBUMIN/GLOB SERPL: 0.5 (ref 1.1–2.2)
ALP SERPL-CCNC: 80 U/L (ref 45–117)
ALT SERPL-CCNC: 17 U/L (ref 12–78)
ANION GAP SERPL CALC-SCNC: 5 MMOL/L (ref 5–15)
AST SERPL-CCNC: 20 U/L (ref 15–37)
BILIRUB SERPL-MCNC: 0.4 MG/DL (ref 0.2–1)
BUN SERPL-MCNC: 14 MG/DL (ref 6–20)
BUN/CREAT SERPL: 16 (ref 12–20)
CALCIUM SERPL-MCNC: 8.2 MG/DL (ref 8.5–10.1)
CHLORIDE SERPL-SCNC: 102 MMOL/L (ref 97–108)
CO2 SERPL-SCNC: 29 MMOL/L (ref 21–32)
COMMENT, HOLDF: NORMAL
CREAT SERPL-MCNC: 0.86 MG/DL (ref 0.7–1.3)
ECHO AO ROOT DIAM: 3.7 CM
ECHO AO ROOT INDEX: 1.48 CM/M2
ECHO AV PEAK GRADIENT: 6 MMHG
ECHO AV PEAK VELOCITY: 1.2 M/S
ECHO AV VELOCITY RATIO: 0.75
ECHO EST RA PRESSURE: 3 MMHG
ECHO LA DIAMETER INDEX: 1.56 CM/M2
ECHO LA DIAMETER: 3.9 CM
ECHO LA TO AORTIC ROOT RATIO: 1.05
ECHO LV E' LATERAL VELOCITY: 8 CM/S
ECHO LV E' SEPTAL VELOCITY: 9 CM/S
ECHO LV EDV A2C: 98 ML
ECHO LV EDV A4C: 114 ML
ECHO LV EDV BP: 111 ML (ref 67–155)
ECHO LV EDV INDEX A4C: 46 ML/M2
ECHO LV EDV INDEX BP: 44 ML/M2
ECHO LV EDV NDEX A2C: 39 ML/M2
ECHO LV EJECTION FRACTION A2C: 45 %
ECHO LV EJECTION FRACTION A4C: 61 %
ECHO LV EJECTION FRACTION BIPLANE: 50 % (ref 55–100)
ECHO LV ESV A2C: 54 ML
ECHO LV ESV A4C: 44 ML
ECHO LV ESV BP: 56 ML (ref 22–58)
ECHO LV ESV INDEX A2C: 22 ML/M2
ECHO LV ESV INDEX A4C: 18 ML/M2
ECHO LV ESV INDEX BP: 22 ML/M2
ECHO LV FRACTIONAL SHORTENING: 26 % (ref 28–44)
ECHO LV INTERNAL DIMENSION DIASTOLE INDEX: 2.12 CM/M2
ECHO LV INTERNAL DIMENSION DIASTOLIC: 5.3 CM (ref 4.2–5.9)
ECHO LV INTERNAL DIMENSION SYSTOLIC INDEX: 1.56 CM/M2
ECHO LV INTERNAL DIMENSION SYSTOLIC: 3.9 CM
ECHO LV IVSD: 0.7 CM (ref 0.6–1)
ECHO LV MASS 2D: 187.3 G (ref 88–224)
ECHO LV MASS INDEX 2D: 74.9 G/M2 (ref 49–115)
ECHO LV POSTERIOR WALL DIASTOLIC: 1.2 CM (ref 0.6–1)
ECHO LV RELATIVE WALL THICKNESS RATIO: 0.45
ECHO LVOT PEAK GRADIENT: 3 MMHG
ECHO LVOT PEAK VELOCITY: 0.9 M/S
ECHO MV E VELOCITY: 1 M/S
ECHO MV E/E' LATERAL: 12.5
ECHO MV E/E' RATIO (AVERAGED): 11.81
ECHO MV E/E' SEPTAL: 11.11
ECHO PV MAX VELOCITY: 0.7 M/S
ECHO PV PEAK GRADIENT: 2 MMHG
ECHO RIGHT VENTRICULAR SYSTOLIC PRESSURE (RVSP): 20 MMHG
ECHO RV TAPSE: 1.2 CM (ref 1.7–?)
ECHO TV REGURGITANT MAX VELOCITY: 2.07 M/S
ECHO TV REGURGITANT PEAK GRADIENT: 17 MMHG
GLOBULIN SER CALC-MCNC: 4.6 G/DL (ref 2–4)
GLUCOSE BLD STRIP.AUTO-MCNC: 112 MG/DL (ref 65–117)
GLUCOSE BLD STRIP.AUTO-MCNC: 116 MG/DL (ref 65–117)
GLUCOSE BLD STRIP.AUTO-MCNC: 127 MG/DL (ref 65–117)
GLUCOSE SERPL-MCNC: 97 MG/DL (ref 65–100)
POTASSIUM SERPL-SCNC: 3.8 MMOL/L (ref 3.5–5.1)
PROT SERPL-MCNC: 7 G/DL (ref 6.4–8.2)
SAMPLES BEING HELD,HOLD: NORMAL
SERVICE CMNT-IMP: ABNORMAL
SERVICE CMNT-IMP: NORMAL
SERVICE CMNT-IMP: NORMAL
SODIUM SERPL-SCNC: 136 MMOL/L (ref 136–145)

## 2023-03-07 PROCEDURE — 82962 GLUCOSE BLOOD TEST: CPT

## 2023-03-07 PROCEDURE — 65660000001 HC RM ICU INTERMED STEPDOWN

## 2023-03-07 PROCEDURE — 74011250636 HC RX REV CODE- 250/636: Performed by: HOSPITALIST

## 2023-03-07 PROCEDURE — 97116 GAIT TRAINING THERAPY: CPT

## 2023-03-07 PROCEDURE — 74011000250 HC RX REV CODE- 250: Performed by: FAMILY MEDICINE

## 2023-03-07 PROCEDURE — 74011250637 HC RX REV CODE- 250/637: Performed by: FAMILY MEDICINE

## 2023-03-07 PROCEDURE — C8929 TTE W OR WO FOL WCON,DOPPLER: HCPCS

## 2023-03-07 PROCEDURE — 74011000258 HC RX REV CODE- 258: Performed by: HOSPITALIST

## 2023-03-07 PROCEDURE — 74011000250 HC RX REV CODE- 250: Performed by: HOSPITALIST

## 2023-03-07 PROCEDURE — 97535 SELF CARE MNGMENT TRAINING: CPT

## 2023-03-07 PROCEDURE — 36415 COLL VENOUS BLD VENIPUNCTURE: CPT

## 2023-03-07 PROCEDURE — 74011250637 HC RX REV CODE- 250/637: Performed by: HOSPITALIST

## 2023-03-07 PROCEDURE — 80053 COMPREHEN METABOLIC PANEL: CPT

## 2023-03-07 RX ORDER — TRAMADOL HYDROCHLORIDE 50 MG/1
50 TABLET ORAL
Status: DISCONTINUED | OUTPATIENT
Start: 2023-03-07 | End: 2023-03-14 | Stop reason: HOSPADM

## 2023-03-07 RX ORDER — DILTIAZEM HYDROCHLORIDE 180 MG/1
180 CAPSULE, COATED, EXTENDED RELEASE ORAL DAILY
Status: DISCONTINUED | OUTPATIENT
Start: 2023-03-08 | End: 2023-03-14 | Stop reason: HOSPADM

## 2023-03-07 RX ADMIN — VANCOMYCIN HYDROCHLORIDE 1000 MG: 1 INJECTION, POWDER, LYOPHILIZED, FOR SOLUTION INTRAVENOUS at 08:21

## 2023-03-07 RX ADMIN — VANCOMYCIN HYDROCHLORIDE 1000 MG: 1 INJECTION, POWDER, LYOPHILIZED, FOR SOLUTION INTRAVENOUS at 20:44

## 2023-03-07 RX ADMIN — METOPROLOL SUCCINATE 100 MG: 50 TABLET, EXTENDED RELEASE ORAL at 08:20

## 2023-03-07 RX ADMIN — MICONAZOLE NITRATE 2 % TOPICAL POWDER: at 18:00

## 2023-03-07 RX ADMIN — DILTIAZEM HYDROCHLORIDE 60 MG: 60 TABLET, FILM COATED ORAL at 13:15

## 2023-03-07 RX ADMIN — TRAMADOL HYDROCHLORIDE 50 MG: 50 TABLET ORAL at 16:17

## 2023-03-07 RX ADMIN — SODIUM CHLORIDE, PRESERVATIVE FREE 10 ML: 5 INJECTION INTRAVENOUS at 06:14

## 2023-03-07 RX ADMIN — DILTIAZEM HYDROCHLORIDE 60 MG: 60 TABLET, FILM COATED ORAL at 16:17

## 2023-03-07 RX ADMIN — CEFEPIME 2 G: 2 INJECTION, POWDER, FOR SOLUTION INTRAVENOUS at 15:00

## 2023-03-07 RX ADMIN — MICONAZOLE NITRATE 2 % TOPICAL POWDER: at 09:00

## 2023-03-07 RX ADMIN — ACETAMINOPHEN 650 MG: 325 TABLET ORAL at 13:12

## 2023-03-07 RX ADMIN — PERFLUTREN 1.5 ML: 6.52 INJECTION, SUSPENSION INTRAVENOUS at 11:00

## 2023-03-07 RX ADMIN — CYANOCOBALAMIN TAB 500 MCG 1000 MCG: 500 TAB at 08:21

## 2023-03-07 RX ADMIN — SODIUM CHLORIDE, PRESERVATIVE FREE 10 ML: 5 INJECTION INTRAVENOUS at 16:19

## 2023-03-07 RX ADMIN — FUROSEMIDE 40 MG: 10 INJECTION, SOLUTION INTRAMUSCULAR; INTRAVENOUS at 08:21

## 2023-03-07 RX ADMIN — CEFEPIME 2 G: 2 INJECTION, POWDER, FOR SOLUTION INTRAVENOUS at 03:26

## 2023-03-07 RX ADMIN — APIXABAN 5 MG: 5 TABLET, FILM COATED ORAL at 20:44

## 2023-03-07 RX ADMIN — SODIUM CHLORIDE, PRESERVATIVE FREE 10 ML: 5 INJECTION INTRAVENOUS at 22:00

## 2023-03-07 RX ADMIN — DILTIAZEM HYDROCHLORIDE 60 MG: 60 TABLET, FILM COATED ORAL at 06:13

## 2023-03-07 RX ADMIN — APIXABAN 5 MG: 5 TABLET, FILM COATED ORAL at 08:21

## 2023-03-07 NOTE — PROGRESS NOTES
0800: Bedside and Verbal shift change report given to Ivania Tyler (oncoming nurse) by Alina Saravia RN (offgoing nurse). Report included the following information SBAR and Kardex. 1100: Bilateral leg dressings changed, pt up in chair. Dr. Bo Byrd at bedside to visualize wounds.

## 2023-03-07 NOTE — PROGRESS NOTES
Problem: Mobility Impaired (Adult and Pediatric)  Goal: *Acute Goals and Plan of Care (Insert Text)  Description: FUNCTIONAL STATUS PRIOR TO ADMISSION: Patient was independent and active without use of DME. He reported that he sleeps in a chair and was unable to tell me when the last time was that he slept in a bed. He denied any falls in the last 12 months when asked    [address] [facility]: The patient lived with roommates but did not require assist.. Physical Therapy Goals  Initiated 3/6/2023  1. Patient will move from supine to sit and sit to supine , scoot up and down, and roll side to side in bed with independence within 7 day(s). 2.  Patient will transfer from bed to chair and chair to bed with independence using the least restrictive device within 7 day(s). 3.  Patient will perform sit to stand with independence within 7 day(s). 4.  Patient will ambulate with independence for 150 feet with the least restrictive device within 7 day(s). 5.  Patient will ascend/descend 13 stairs with one handrail(s) with modified independence within 7 day(s). Outcome: Progressing Towards Goal   PHYSICAL THERAPY TREATMENT  Patient: Temitope Gallegos (16 y.o. male)  Date: 3/7/2023  Diagnosis: Atrial fibrillation with RVR ([facility] [address] [I48.91]  Edema of lower extremity [R60.0]  Cellulitis of left lower extremity [L03.116]  Cellulitis of right lower extremity [L03.115] <principal problem not specified>      Precautions: Fall, Skin (bariatric equipment needs)  Chart, physical therapy assessment, plan of care and goals were reviewed. ASSESSMENT  Patient continues with skilled PT services and is progressing towards goals. Pt able to amb a slightly longer distance today, limited by LE pain. His gait is characterized by a lateral trunk sway (likely at least partly due to body habitus (BMI of 43.97). Anticipate steady gains and discharge to home.     Current Level of Function Impacting Discharge (mobility/balance): just stand by assist provided for transfers and amb (primarily for line management). Other factors to consider for discharge:  13 stairs to access his home         PLAN :  Patient continues to benefit from skilled intervention to address the above impairments. Continue treatment per established plan of care. to address goals. Recommendation for discharge: (in order for the patient to meet his/her long term goals)  Physical therapy at least 2 days/week in the home vs none pending progress    This discharge recommendation:  A follow-up discussion with the attending provider and/or case management is planned    IF patient discharges home will need the following DME: none       SUBJECTIVE:   Patient stated I need to sit down.     OBJECTIVE DATA SUMMARY:   Chart checked, pt cleared by nursing  Critical Behavior:  Neurologic State: Alert  Orientation Level: Oriented X4  Cognition: Appropriate decision making  Safety/Judgement: Awareness of environment, Fall prevention, Insight into deficits  Functional Mobility Training:  Bed Mobility:         Not assessed            Transfers:  Sit to Stand: Stand-by assistance  Stand to Sit: Stand-by assistance                             Balance:     Ambulation/Gait Training:  Distance (ft): 12 Feet (ft)  Assistive Device: Gait belt  Ambulation - Level of Assistance: Stand-by assistance        Gait Abnormalities: Decreased step clearance;Trunk sway increased        Base of Support: Widened     Speed/Connie: Slow;Pace decreased (<100 feet/min); Shuffled  Step Length: Left shortened;Right shortened                    Stairs: Therapeutic Exercises:     Pain Rating:  LEs: 1/10 at rest and prasanth to 4/10 with 10 feet of amb.  Alerted his nurse of his request for pain medication    Activity Tolerance:   Good and limited by LE pain    After treatment patient left in no apparent distress:   Sitting in chair, Call bell within reach, and LEs elevated    COMMUNICATION/COLLABORATION: The patients plan of care was discussed with: Registered nurse.      Enrique Ohs   Time Calculation: 16 mins

## 2023-03-07 NOTE — PROGRESS NOTES
Problem: Self Care Deficits Care Plan (Adult)  Goal: *Acute Goals and Plan of Care (Insert Text)  Description:   FUNCTIONAL STATUS PRIOR TO ADMISSION: Patient was independent and active without use of DME. Able to complete ADL/iADL independently, relied on the grocery cart for support. Currently sleeping in recliner or chairs d/t sinus problem interrupting quality of sleep. HOME SUPPORT: Lives with roommates, one is in the hospital at this time    Occupational Therapy Goals  Initiated 3/6/2023  1. Patient will perform grooming with modified independence within 7 day(s). 2.  Patient will perform upper body dressing with supervision/set-up within 7 day(s). 3.  Patient will perform lower body dressing with moderate assistance  within 7 day(s). 4.  Patient will perform all aspects of toileting with supervision/set-up within 7 day(s). 5.  Patient will utilize energy conservation techniques during functional activities with verbal cues within 7 day(s). Outcome: Progressing Towards Goal   OCCUPATIONAL THERAPY TREATMENT  Patient: Mary Ellen Zavaleta (60 y.o. male)  Date: 3/7/2023  Diagnosis: Atrial fibrillation with RVR ([facility] [I48.91]  Edema of lower extremity [R60.0]  Cellulitis of left lower extremity [L03.116]  Cellulitis of right lower extremity [L03.115] <principal problem not specified>      Precautions: Fall, Skin (bariatric equipment needs)  Chart, occupational therapy assessment, plan of care, and goals were reviewed. ASSESSMENT  Patient continues with skilled OT services and is progressing towards goals. Patient received OOB in chair, amenable to session. Completed transfers to standing to assist with pressure relief. Educated on positioning, patient with good insight and recall from prior session. Completed seated ADL, deferred standing d/t reported fatigue. Left seated in chair, all needs in reach, NAD. [facility] progress.       Current Level of Function Impacting Discharge (ADLs): x1 assist ADL/mobility    Other factors to consider for discharge: below baseline         PLAN :  Patient continues to benefit from skilled intervention to address the above impairments. Continue treatment per established plan of care to address goals. Recommend with staff: OOB 3x daily for meals, functional mobility to bathroom    Recommend next OT session: OOB ADL    Recommendation for discharge: (in order for the patient to meet his/her long term goals)  Occupational therapy at least 2 days/week in the home AND ensure assist and/or supervision for safety with ADL/IADL    This discharge recommendation:  Has not yet been discussed the attending provider and/or case management    IF patient discharges home will need the following DME: TBD pending progress       SUBJECTIVE:   Patient stated Oh this feels good.     OBJECTIVE DATA SUMMARY:   Cognitive/Behavioral Status:           Perception: Appears intact  Perseveration: No perseveration noted  Safety/Judgement: Awareness of environment; Fall prevention;Home safety; Insight into deficits    Functional Mobility and Transfers for ADLs:  Bed Mobility:       Transfers:  Sit to Stand: Contact guard assistance          Balance:  Sitting: Intact; Without support  Standing: Impaired  Standing - Static: Constant support;Good  Standing - Dynamic : Constant support;Good    ADL Intervention:       Grooming  Position Performed: Seated in chair  Washing Face: Supervision  Brushing/Combing Hair: Set-up  Cues: Verbal cues provided         Type of Bath: Chlorhexidine (CHG); Full                        Cognitive Retraining  Safety/Judgement: Awareness of environment; Fall prevention;Home safety; Insight into deficits    Pain:  None reported    Activity Tolerance:   Fair and requires rest breaks    After treatment patient left in no apparent distress:   Sitting in chair and Call bell within reach    COMMUNICATION/COLLABORATION:   The patients plan of care was discussed with: Physical therapist and Registered nurse.      Bonita Hardin, OT  Time Calculation: 24 mins

## 2023-03-07 NOTE — PROGRESS NOTES
Bedside and Verbal shift change report given to [facility], [facility] (oncoming nurse) by Serge Ramirez (offgoing nurse). Report included the following information SBAR and Kardex.

## 2023-03-07 NOTE — PROGRESS NOTES
Transition of Care: home with home health PT/OT/SN; [facility] accepted    Denied:  [address]: possibly in car; patient has medicaid and is normally ambulatory     RUR: 7%     DX: bilateral cellulitis     Main contact:      Discharge pending:   -pending medical progress and care recommendations     1011: this CM noted that [facility] accepted; info on the avs     CM following  Evita Coyle RN     NOTE:. Patient is alert and oriented x4. Demographics confirmed. Patient resides in a senior living apartment with a roommate. There are no steps to enter as there are elevators. No DME. Patient is independent in ADLs and ambulation. He does have a 's license, but no personal vehicle. Has access to Medicaid transportation. Hx: afib on Eliquis, HTN, diabetes type II, obesity. PCP confirmed and pharmacy used is [facility] located at [address] [place] [address] [facility] phone: [phone].          Revision History

## 2023-03-07 NOTE — PROGRESS NOTES
Hospitalist Progress Note  Nellie Lozoya MD  Answering service: [phone] from in house phone        Date of Service:  3/7/2023  NAME:  Kimberly Espinosa  :  15/49/2035  MRN:  [id number]      Admission Summary:   Kimberly Espinosa is a 75 y.o. male past medical history of atrial fibrillation, long-term anticoagulation therapy on Eliquis, lower extremity edema, cellulitis, hypertension, type 2 diabetes mellitus, scrotal hernia, and obesity presented to the emergency department via EMS from home with chief complaints of unhealed wounds. Patient has multiple wounds of bilateral lower extremities with history of chronic venous stasis. Per reports patient's bilateral lower extremity wounds have been weeping with pus over the last 7 days with increased swelling edema both legs to severe, constant, without specific alleviating factors. There is no reports of fever. On arrival emergency department, initial reported vital signs temperature 98.4 °F, /105, heart rate 113, respiratory rate 20, O2 saturations 99% on room air. Left and right tib-fib fib x-ray showed bilateral lower extremity edema, minimal periosteal reaction along tibia and fibular diaphysis questionable for stress reaction or infection. 12 lead EKG showed atrial fibrillation with rapid ventricular response at 107 bpm.  ED administered diltiazem 20 mg IV x1 dose, Tylenol 650 mg p.o. x1, vancomycin 2500 mg IV x1 dose. Patient is now seen for admission to the hospitalist service. Patient complains of severe pain associated with leg swelling and edema. Despite the same, he notes he is ambulatory without assistance. In addition, he has scrotal open wounds which she notes are chronic and had hernia associated with scrotal swelling for several years.           Interval history / Subjective:   Leg swelling better   Wound dressing changed together with nurse today,      Assessment & Plan:      1. Atrial fibrillation with RVR   -monitor  IMCU  -continue Diltiazem titrated IV infusion changed to po cardizem, will change to SR in am   -Goal heart rate less than 100  -Continuous cardiac monitoring  -Consulted  cardiologist  -Continue  Eliquis  Echo  EF 45-50%      2. Edema of bilateral lower extremities  Acute on chronic systolic/diastolic congestive heart failure   -Chronic venous stasis  -proBNP 1513  -Keep legs elevated at rest  -Placed on strict I's and O's and daily weights  - Lasix 40 mg IV daily, continue as long renal funciton tolerate      3. Cellulitis of right lower extremity        Cellulitis of left lower extremity   -Continue vancomycin pharmacy dosing  -Check paired blood cultures  -added iv cefepime due to the extensive imvolvement 3/4   -wound still having significant of discharge, will continue iv abx 1-2 days then change to po if wound better      4. Multiple wounds  -Open wounds right distal leg and scrotum  -Continue wound cares and dressing changes  -Consult care team     5. Type 2 diabetes mellitus  -Order Humalog insulin correctional coverage, scheduled blood glucose checks and check hemoglobin A1c level. 6.  Essential hypertension  -Continue metoprolol     7. Class III obesity  -BMI 37.31 kg/M2  -Would encourage weight loss, reduced calorie diet, lifestyle modifications    8. Suprapubic catheter           Code status: full   Prophylaxis: eliquis   Care Plan discussed with: pt, rn   Anticipated Disposition: tbd , pt, ot eval, pt was living with a roommate prior, may [facility] back to [facility] .  Concerning if he can do his dressing change daily at home      Hospital Problems  Never Reviewed            Codes Class Noted POA    Atrial fibrillation with RVR ([facility]) ICD-10-CM: I48.91  ICD-9-CM: 427.31  3/3/2023 Unknown        Edema of lower extremity ICD-10-CM: R60.0  ICD-9-CM: 782.3  3/3/2023 Unknown        Cellulitis of right lower extremity ICD-10-CM: M24.506  ICD-9-CM: 682.6  3/3/2023 Unknown        Cellulitis of left lower extremity ICD-10-CM: L03.116  ICD-9-CM: 682.6  3/3/2023 Unknown         Review of Systems:   A comprehensive review of systems was negative except for that written in the HPI. Vital Signs:    Last 24hrs VS reviewed since prior progress note. Most recent are:  Visit Vitals  /64   Pulse 94   Temp 98.4 °F (36.9 °C)   Resp 20   Ht 5' 10\" (1.778 m)   Wt 139 kg (306 lb 7 oz)   SpO2 93%   BMI 43.97 kg/m²         Intake/Output Summary (Last 24 hours) at 3/7/2023 1620  Last data filed at 3/7/2023 1200  Gross per 24 hour   Intake --   Output 3400 ml   Net -3400 ml          Physical Examination:     I had a face to face encounter with this patient and independently examined them on 3/7/2023 as outlined below:          General : looks confused, unsure baseline mental status   HEENT: PEERL, EOMI, moist mucus membrane, TM clear  Neck: supple, no JVD, no meningeal signs  Chest: Clear to auscultation bilaterally   CVS: S1 S2 heard, Capillary refill less than 2 seconds  Abd: soft, large inguinal hernia, suprapuic catheter, mild red groin folded shon   Ext:Extensive, severe diffuse erythema, tenderness, swelling of both legs with large open stage II wound on the posterior aspect distal right leg  Stage II large open wound on scrotum  Edema improved     Neuro/Psych:no focal weakness. Skin: warm            Data Review:    Review and/or order of clinical lab test    I have independently reviewed and interpreted patient's lab and all other diagnostic data    Notes reviewed from all clinical/nonclinical/nursing services involved in patient's clinical care. Care coordination discussions were held with appropriate clinical/nonclinical/ nursing providers based on care coordination needs.      Labs:     Recent Labs     03/06/23  0514 03/05/23  0322   WBC 9.4 10.0   HGB 12.2 12.9   HCT 39.3 41.2    168       Recent Labs     03/07/23  0148 03/06/23  0514 03/05/23 0322 03/05/23 0317    140  --  139   K 3.8 3.6  --  4.1    104  --  105   CO2 29 31  --  27   BUN 14 12  --  10   CREA 0.86 0.83  --  0.74   GLU 97 100  --  95   CA 8.2* 8.5  --  8.3*   MG  --  1.9 1.8  --    PHOS  --  3.1  --   --        Recent Labs     03/07/23  0148 03/06/23  0514 03/05/23 0317   ALT 17 14 17   AP 80 74 75   TBILI 0.4 0.5 0.6   TP 7.0 6.6 6.4   ALB 2.4* 2.4* 2.6*   GLOB 4.6* 4.2* 3.8       Recent Labs     03/05/23 0349   INR 1.1   PTP 11.4*   APTT 28.9        No results for input(s): FE, TIBC, PSAT, FERR in the last 72 hours. No results found for: FOL, RBCF   No results for input(s): PH, PCO2, PO2 in the last 72 hours. No results for input(s): CPK, CKNDX, TROIQ in the last 72 hours.     No lab exists for component: CPKMB  No results found for: CHOL, CHOLX, CHLST, CHOLV, HDL, HDLP, LDL, LDLC, DLDLP, TGLX, TRIGL, TRIGP, CHHD, CHHDX  Lab Results   Component Value Date/Time    Glucose (POC) 112 03/07/2023 08:23 AM    Glucose (POC) 110 03/06/2023 10:32 PM    Glucose (POC) 105 03/06/2023 05:21 PM    Glucose (POC) 116 03/06/2023 12:50 PM    Glucose (POC) 109 03/06/2023 08:41 AM     No results found for: COLOR, APPRN, SPGRU, REFSG, HUBER, PROTU, GLUCU, KETU, BILU, UROU, SHARON, LEUKU, GLUKE, EPSU, BACTU, WBCU, RBCU, CASTS, UCRY      Medications Reviewed:     Current Facility-Administered Medications   Medication Dose Route Frequency    traMADoL (ULTRAM) tablet 50 mg  50 mg Oral Q6H PRN    vancomycin (VANCOCIN) 1,000 mg in 0.9% sodium chloride 250 mL ([id number])  1,000 mg IntraVENous Q12H    dilTIAZem IR (CARDIZEM) tablet 60 mg  60 mg Oral TIDAC    apixaban (ELIQUIS) tablet 5 mg  5 mg Oral Q12H    ergocalciferol capsule 50,000 Units  50,000 Units Oral every Saturday    metoprolol succinate (TOPROL-XL) XL tablet 100 mg  100 mg Oral DAILY    cyanocobalamin (VITAMIN B12) tablet 1,000 mcg  1,000 mcg Oral DAILY    glucose chewable tablet 16 g  4 Tablet Oral PRN    glucagon (GLUCAGEN) injection 1 mg  1 mg IntraMUSCular PRN    dextrose 10 % infusion 0-250 mL  0-250 mL IntraVENous PRN    insulin lispro (HUMALOG) injection   SubCUTAneous AC&HS    sodium chloride (NS) flush 5-40 mL  5-40 mL IntraVENous Q8H    sodium chloride (NS) flush 5-40 mL  5-40 mL IntraVENous PRN    acetaminophen (TYLENOL) tablet 650 mg  650 mg Oral Q6H PRN    Or    acetaminophen (TYLENOL) suppository 650 mg  650 mg Rectal Q6H PRN    polyethylene glycol (MIRALAX) packet 17 g  17 g Oral DAILY PRN    ondansetron (ZOFRAN ODT) tablet 4 mg  4 mg Oral Q8H PRN    Or    ondansetron (ZOFRAN) injection 4 mg  4 mg IntraVENous Q6H PRN    morphine injection 4 mg  4 mg IntraVENous Q4H PRN    vancomycin - pharmacy to dose   Other Rx Dosing/Monitoring    cefepime (MAXIPIME) 2 g in 0.9% sodium chloride (MBP/ADV) 100 mL MBP  2 g IntraVENous Q12H    furosemide (LASIX) injection 40 mg  40 mg IntraVENous DAILY    miconazole (MICOTIN) 2 % powder   Topical BID     ______________________________________________________________________  EXPECTED LENGTH OF STAY: 2d 7h  ACTUAL LENGTH OF STAY:          4                 Hussain Madden MD

## 2023-03-07 NOTE — PROGRESS NOTES
Spiritual Care Partner Volunteer visited patient at [address] [place] [address] 4 IMCU 2 on 3/7/2023   Documented by:  Chaplain Agosto MDiv, MS, [facility]

## 2023-03-07 NOTE — PROGRESS NOTES
Problem: Falls - Risk of  Goal: *Absence of Falls  Description: Document [facility] Fall Risk and appropriate interventions in the flowsheet.   Outcome: Progressing Towards Goal  Note: Fall Risk Interventions:            Medication Interventions: Bed/chair exit alarm    Elimination Interventions: Bed/chair exit alarm, Call light in reach

## 2023-03-07 NOTE — PROGRESS NOTES
Spoke with daughter Genaro Frausto to give daughter update on her status. She indicated that would like the tube feeding to be held since it was making her mother short of breath and uncomfortable. Will continue to monitor glucose and notify attending. Bed alarm on Will continue to monitor.

## 2023-03-08 LAB
ANION GAP SERPL CALC-SCNC: 6 MMOL/L (ref 5–15)
BUN SERPL-MCNC: 15 MG/DL (ref 6–20)
BUN/CREAT SERPL: 21 (ref 12–20)
CALCIUM SERPL-MCNC: 8.6 MG/DL (ref 8.5–10.1)
CHLORIDE SERPL-SCNC: 103 MMOL/L (ref 97–108)
CO2 SERPL-SCNC: 30 MMOL/L (ref 21–32)
CREAT SERPL-MCNC: 0.72 MG/DL (ref 0.7–1.3)
ERYTHROCYTE [DISTWIDTH] IN BLOOD BY AUTOMATED COUNT: 14.4 % (ref 11.5–14.5)
GLUCOSE BLD STRIP.AUTO-MCNC: 100 MG/DL (ref 65–117)
GLUCOSE BLD STRIP.AUTO-MCNC: 107 MG/DL (ref 65–117)
GLUCOSE BLD STRIP.AUTO-MCNC: 125 MG/DL (ref 65–117)
GLUCOSE BLD STRIP.AUTO-MCNC: 128 MG/DL (ref 65–117)
GLUCOSE SERPL-MCNC: 92 MG/DL (ref 65–100)
HCT VFR BLD AUTO: 38.2 % (ref 36.6–50.3)
HGB BLD-MCNC: 12.3 G/DL (ref 12.1–17)
MCH RBC QN AUTO: 31.2 PG (ref 26–34)
MCHC RBC AUTO-ENTMCNC: 32.2 G/DL (ref 30–36.5)
MCV RBC AUTO: 97 FL (ref 80–99)
NRBC # BLD: 0 K/UL (ref 0–0.01)
NRBC BLD-RTO: 0 PER 100 WBC
PLATELET # BLD AUTO: 211 K/UL (ref 150–400)
PMV BLD AUTO: 11.7 FL (ref 8.9–12.9)
POTASSIUM SERPL-SCNC: 3.7 MMOL/L (ref 3.5–5.1)
RBC # BLD AUTO: 3.94 M/UL (ref 4.1–5.7)
SERVICE CMNT-IMP: ABNORMAL
SERVICE CMNT-IMP: ABNORMAL
SERVICE CMNT-IMP: NORMAL
SERVICE CMNT-IMP: NORMAL
SODIUM SERPL-SCNC: 139 MMOL/L (ref 136–145)
WBC # BLD AUTO: 8.2 K/UL (ref 4.1–11.1)

## 2023-03-08 PROCEDURE — 97530 THERAPEUTIC ACTIVITIES: CPT | Performed by: PHYSICAL THERAPIST

## 2023-03-08 PROCEDURE — 97116 GAIT TRAINING THERAPY: CPT | Performed by: PHYSICAL THERAPIST

## 2023-03-08 PROCEDURE — 85027 COMPLETE CBC AUTOMATED: CPT

## 2023-03-08 PROCEDURE — 74011000258 HC RX REV CODE- 258: Performed by: HOSPITALIST

## 2023-03-08 PROCEDURE — 74011250637 HC RX REV CODE- 250/637: Performed by: HOSPITALIST

## 2023-03-08 PROCEDURE — 36415 COLL VENOUS BLD VENIPUNCTURE: CPT

## 2023-03-08 PROCEDURE — 65270000046 HC RM TELEMETRY

## 2023-03-08 PROCEDURE — 74011250637 HC RX REV CODE- 250/637: Performed by: FAMILY MEDICINE

## 2023-03-08 PROCEDURE — 74011250636 HC RX REV CODE- 250/636: Performed by: HOSPITALIST

## 2023-03-08 PROCEDURE — 82962 GLUCOSE BLOOD TEST: CPT

## 2023-03-08 PROCEDURE — 74011000250 HC RX REV CODE- 250: Performed by: FAMILY MEDICINE

## 2023-03-08 PROCEDURE — 80048 BASIC METABOLIC PNL TOTAL CA: CPT

## 2023-03-08 PROCEDURE — 97535 SELF CARE MNGMENT TRAINING: CPT

## 2023-03-08 RX ADMIN — TRAMADOL HYDROCHLORIDE 50 MG: 50 TABLET ORAL at 10:46

## 2023-03-08 RX ADMIN — CYANOCOBALAMIN TAB 500 MCG 1000 MCG: 500 TAB at 09:27

## 2023-03-08 RX ADMIN — CEFEPIME 2 G: 2 INJECTION, POWDER, FOR SOLUTION INTRAVENOUS at 15:33

## 2023-03-08 RX ADMIN — MICONAZOLE NITRATE 2 % TOPICAL POWDER: at 18:00

## 2023-03-08 RX ADMIN — VANCOMYCIN HYDROCHLORIDE 1000 MG: 1 INJECTION, POWDER, LYOPHILIZED, FOR SOLUTION INTRAVENOUS at 09:27

## 2023-03-08 RX ADMIN — APIXABAN 5 MG: 5 TABLET, FILM COATED ORAL at 21:43

## 2023-03-08 RX ADMIN — DILTIAZEM HYDROCHLORIDE 180 MG: 180 CAPSULE, COATED, EXTENDED RELEASE ORAL at 09:27

## 2023-03-08 RX ADMIN — DILTIAZEM HYDROCHLORIDE 60 MG: 60 TABLET, FILM COATED ORAL at 06:13

## 2023-03-08 RX ADMIN — SODIUM CHLORIDE, PRESERVATIVE FREE 10 ML: 5 INJECTION INTRAVENOUS at 13:52

## 2023-03-08 RX ADMIN — VANCOMYCIN HYDROCHLORIDE 1000 MG: 1 INJECTION, POWDER, LYOPHILIZED, FOR SOLUTION INTRAVENOUS at 21:44

## 2023-03-08 RX ADMIN — METOPROLOL SUCCINATE 100 MG: 50 TABLET, EXTENDED RELEASE ORAL at 09:27

## 2023-03-08 RX ADMIN — SODIUM CHLORIDE, PRESERVATIVE FREE 10 ML: 5 INJECTION INTRAVENOUS at 06:13

## 2023-03-08 RX ADMIN — APIXABAN 5 MG: 5 TABLET, FILM COATED ORAL at 09:27

## 2023-03-08 RX ADMIN — ACETAMINOPHEN 650 MG: 325 TABLET ORAL at 12:41

## 2023-03-08 RX ADMIN — FUROSEMIDE 40 MG: 10 INJECTION, SOLUTION INTRAMUSCULAR; INTRAVENOUS at 09:27

## 2023-03-08 RX ADMIN — SODIUM CHLORIDE, PRESERVATIVE FREE 10 ML: 5 INJECTION INTRAVENOUS at 21:45

## 2023-03-08 RX ADMIN — MICONAZOLE NITRATE 2 % TOPICAL POWDER: at 09:00

## 2023-03-08 RX ADMIN — CEFEPIME 2 G: 2 INJECTION, POWDER, FOR SOLUTION INTRAVENOUS at 03:28

## 2023-03-08 RX ADMIN — TRAMADOL HYDROCHLORIDE 50 MG: 50 TABLET ORAL at 20:22

## 2023-03-08 RX ADMIN — TRAMADOL HYDROCHLORIDE 50 MG: 50 TABLET ORAL at 03:31

## 2023-03-08 NOTE — PROGRESS NOTES
Problem: Self Care Deficits Care Plan (Adult)  Goal: *Acute Goals and Plan of Care (Insert Text)  Description:   FUNCTIONAL STATUS PRIOR TO ADMISSION: Patient was independent and active without use of DME. Able to complete ADL/iADL independently, relied on the grocery cart for support. Currently sleeping in recliner or chairs d/t sinus problem interrupting quality of sleep. HOME SUPPORT: Lives with roommates, one is in the hospital at this time    Occupational Therapy Goals  Initiated 3/6/2023  1. Patient will perform grooming with modified independence within 7 day(s). 2.  Patient will perform upper body dressing with supervision/set-up within 7 day(s). 3.  Patient will perform lower body dressing with moderate assistance  within 7 day(s). 4.  Patient will perform all aspects of toileting with supervision/set-up within 7 day(s). 5.  Patient will utilize energy conservation techniques during functional activities with verbal cues within 7 day(s). Outcome: Progressing Towards Goal   OCCUPATIONAL THERAPY TREATMENT  Patient: Niharika Etienne (78 y.o. male)  Date: 3/8/2023  Diagnosis: Atrial fibrillation with RVR ([facility]) [I48.91]  Edema of lower extremity [R60.0]  Cellulitis of left lower extremity [L03.116]  Cellulitis of right lower extremity [L03.115] <principal problem not specified>      Precautions: Fall, Skin (bariatric equipment needs)  Chart, occupational therapy assessment, plan of care, and goals were reviewed. ASSESSMENT  Patient continues with skilled OT services and is progressing towards goals. Patient received reclined in bed, reporting to have been saturated d/t external catheter malfunction. Completed transfers with x1 assist, benefits from min verbal cues for safety during session. Able to complete standing grooming/bathing with overall SBA. Transferred to bedside chair with BLE elevated and all needs in reach.  Patient continues to make steady functional gains, anticipate home with [facility] vs no needs pending further progress. Current Level of Function Impacting Discharge (ADLs): x1 assist for ADL/mobility    Other factors to consider for discharge: improving BLE edema         PLAN :  Patient continues to benefit from skilled intervention to address the above impairments. Continue treatment per established plan of care to address goals. Recommend with staff: OOB 3x daily for meals, functional mobility to bathroom    Recommend next OT session: OOB ADL, cont improving activity tolerance    Recommendation for discharge: (in order for the patient to meet his/her long term goals)  Occupational therapy at least 2 days/week in the home     This discharge recommendation:  Has not yet been discussed the attending provider and/or case management    IF patient discharges home will need the following DME: TBD pending progress       SUBJECTIVE:   Patient stated Oh I am WET.     OBJECTIVE DATA SUMMARY:   Cognitive/Behavioral Status:  Neurologic State: Alert  Orientation Level: Oriented X4  Cognition: Appropriate decision making; Appropriate safety awareness; Follows commands  Perception: Appears intact  Perseveration: No perseveration noted  Safety/Judgement: Awareness of environment; Fall prevention; Insight into deficits    Functional Mobility and Transfers for ADLs:  Bed Mobility:  Supine to Sit: Stand-by assistance    Transfers:  Sit to Stand: Stand-by assistance     Bed to Chair: Contact guard assistance    Balance:  Sitting: Intact  Standing: Impaired  Standing - Static: Good  Standing - Dynamic : Constant support;Good    ADL Intervention:       Grooming  Position Performed: Seated in chair  Washing Face: Supervision    Upper Body Bathing  Bathing Assistance: Stand-by assistance  Position Performed: Standing         Lower Body Bathing  Perineal  : Stand-by assistance  Position Performed: Standing  Cues: Verbal cues provided  Lower Body : Stand-by assistance  Position Performed: Seated edge of bed  Cues: Verbal cues provided    Upper Body [address]: Stand-by assistance  Cues: Verbal cues provided         Toileting  Bladder Hygiene: Stand-by assistance  Clothing Management: Stand-by assistance    Cognitive Retraining  Safety/Judgement: Awareness of environment; Fall prevention; Insight into deficits      Pain:  None reported    Activity Tolerance:   Good and requires rest breaks    After treatment patient left in no apparent distress:   Sitting in chair and Call bell within reach    COMMUNICATION/COLLABORATION:   The patients plan of care was discussed with: Physical therapist and Registered nurse.      Vanita lAmanzar OT  Time Calculation: 28 mins

## 2023-03-08 NOTE — PROGRESS NOTES
Pt alert and oriented. Vital signs stable. Order placed for pt to downgrade to [address]  [address]. Report called to RN. Pt transported in bed via transport to room 210 with belongings.

## 2023-03-08 NOTE — PROGRESS NOTES
Bedside and Verbal shift change report given to [address] (oncoming nurse) by Jessica Hicks (offgoing nurse). Report included the following information SBAR and Kardex.

## 2023-03-08 NOTE — PROGRESS NOTES
Problem: Mobility Impaired (Adult and Pediatric)  Goal: *Acute Goals and Plan of Care (Insert Text)  Description: FUNCTIONAL STATUS PRIOR TO ADMISSION: Patient was independent and active without use of DME. He reported that he sleeps in a chair and was unable to tell me when the last time was that he slept in a bed. He denied any falls in the last 12 months when asked    [address]: The patient lived with roommates but did not require assist.. Physical Therapy Goals  Initiated 3/6/2023  1. Patient will move from supine to sit and sit to supine , scoot up and down, and roll side to side in bed with independence within 7 day(s). 2.  Patient will transfer from bed to chair and chair to bed with independence using the least restrictive device within 7 day(s). 3.  Patient will perform sit to stand with independence within 7 day(s). 4.  Patient will ambulate with independence for 150 feet with the least restrictive device within 7 day(s). 5.  Patient will ascend/descend 13 stairs with one handrail(s) with modified independence within 7 day(s). Outcome: Progressing Towards Goal  PHYSICAL THERAPY TREATMENT  Patient: Zenon Sanchez (25 y.o. male)  Date: 3/8/2023  Diagnosis: Atrial fibrillation with RVR ([facility]) [I48.91]  Edema of lower extremity [R60.0]  Cellulitis of left lower extremity [L03.116]  Cellulitis of right lower extremity [L03.115] <principal problem not specified>      Precautions: Fall, Skin (bariatric equipment needs)  Chart, physical therapy assessment, plan of care and goals were reviewed. ASSESSMENT  Patient continues with skilled PT services and is progressing towards goals. The patient is in the chair with his LE's elevated and agreeable to therapy. He is able to come to standing independently to use the urinal.  He sat back down and then stood and ambulated around the bed and the room slowly with occasional standing rest breaks for 40 feet.   He was up on his feet for 5 minutes with minimal fatigue. His HR was 101 and PO2 93% and /82. His gait and wide based and slightly waddling. He had no balance loss. He returned to the chair and his LE's were elevated post ambulation. He is cooperative and motivated. Current Level of Function Impacting Discharge (mobility/balance): Low endurance. Other factors to consider for discharge: Lives alone         PLAN :  Patient continues to benefit from skilled intervention to address the above impairments. Continue treatment per established plan of care. to address goals. Recommendation for discharge: (in order for the patient to meet his/her long term goals)  Therapy 3 hours per day 5-7 days per week vs home with [facility]. This discharge recommendation:  Has been made in collaboration with the attending provider and/or case management    IF patient discharges home will need the following DME: none       SUBJECTIVE:   Patient stated That was my cardiac work out for the day.     OBJECTIVE DATA SUMMARY:   Critical Behavior:  Neurologic State: Alert  Orientation Level: Oriented X4  Cognition: Appropriate decision making, Appropriate safety awareness, Follows commands  Safety/Judgement: Awareness of environment, Fall prevention, Insight into deficits  Functional Mobility Training:  Bed Mobility:     Supine to Sit: Stand-by assistance              Transfers:  Sit to Stand: Supervision  Stand to Sit: Supervision        Bed to Chair: Contact guard assistance                    Balance:  Sitting: Intact  Standing: Intact  Standing - Static: Good  Standing - Dynamic : Fair  Ambulation/Gait Training:  Distance (ft): 40 Feet (ft)     Ambulation - Level of Assistance: Supervision        Gait Abnormalities: Decreased step clearance        Base of Support: Widened     Speed/Connie: Pace decreased (<100 feet/min)  Step Length: Right shortened;Left shortened                  Wide based waddling gait. Slow.      Therapeutic Exercises: Ankle pumps x 10 bilat in sitting. Activity Tolerance:   Fair    After treatment patient left in no apparent distress:   Sitting in chair, Heels elevated for pressure relief, and Call bell within reach    COMMUNICATION/COLLABORATION:   The patients plan of care was discussed with: Registered nurse.      Ryan Morrell, PT   Time Calculation: 21 mins

## 2023-03-09 LAB
ANION GAP SERPL CALC-SCNC: 6 MMOL/L (ref 5–15)
BACTERIA SPEC CULT: NORMAL
BUN SERPL-MCNC: 14 MG/DL (ref 6–20)
BUN/CREAT SERPL: 18 (ref 12–20)
CALCIUM SERPL-MCNC: 8.2 MG/DL (ref 8.5–10.1)
CHLORIDE SERPL-SCNC: 104 MMOL/L (ref 97–108)
CO2 SERPL-SCNC: 29 MMOL/L (ref 21–32)
CREAT SERPL-MCNC: 0.79 MG/DL (ref 0.7–1.3)
ERYTHROCYTE [DISTWIDTH] IN BLOOD BY AUTOMATED COUNT: 14.3 % (ref 11.5–14.5)
GLUCOSE BLD STRIP.AUTO-MCNC: 103 MG/DL (ref 65–117)
GLUCOSE BLD STRIP.AUTO-MCNC: 94 MG/DL (ref 65–117)
GLUCOSE BLD STRIP.AUTO-MCNC: 97 MG/DL (ref 65–117)
GLUCOSE BLD STRIP.AUTO-MCNC: 99 MG/DL (ref 65–117)
GLUCOSE SERPL-MCNC: 94 MG/DL (ref 65–100)
HCT VFR BLD AUTO: 39.1 % (ref 36.6–50.3)
HGB BLD-MCNC: 12.4 G/DL (ref 12.1–17)
MCH RBC QN AUTO: 31.1 PG (ref 26–34)
MCHC RBC AUTO-ENTMCNC: 31.7 G/DL (ref 30–36.5)
MCV RBC AUTO: 98 FL (ref 80–99)
NRBC # BLD: 0 K/UL (ref 0–0.01)
NRBC BLD-RTO: 0 PER 100 WBC
PLATELET # BLD AUTO: 219 K/UL (ref 150–400)
PMV BLD AUTO: 11.5 FL (ref 8.9–12.9)
POTASSIUM SERPL-SCNC: 4.2 MMOL/L (ref 3.5–5.1)
RBC # BLD AUTO: 3.99 M/UL (ref 4.1–5.7)
SERVICE CMNT-IMP: NORMAL
SODIUM SERPL-SCNC: 139 MMOL/L (ref 136–145)
VANCOMYCIN SERPL-MCNC: 15 UG/ML
WBC # BLD AUTO: 7.9 K/UL (ref 4.1–11.1)

## 2023-03-09 PROCEDURE — 74011250637 HC RX REV CODE- 250/637: Performed by: FAMILY MEDICINE

## 2023-03-09 PROCEDURE — 74011250636 HC RX REV CODE- 250/636: Performed by: HOSPITALIST

## 2023-03-09 PROCEDURE — 36415 COLL VENOUS BLD VENIPUNCTURE: CPT

## 2023-03-09 PROCEDURE — 97116 GAIT TRAINING THERAPY: CPT

## 2023-03-09 PROCEDURE — 85027 COMPLETE CBC AUTOMATED: CPT

## 2023-03-09 PROCEDURE — 74011250637 HC RX REV CODE- 250/637: Performed by: HOSPITALIST

## 2023-03-09 PROCEDURE — 82962 GLUCOSE BLOOD TEST: CPT

## 2023-03-09 PROCEDURE — 97535 SELF CARE MNGMENT TRAINING: CPT

## 2023-03-09 PROCEDURE — 80202 ASSAY OF VANCOMYCIN: CPT

## 2023-03-09 PROCEDURE — 74011000258 HC RX REV CODE- 258: Performed by: HOSPITALIST

## 2023-03-09 PROCEDURE — 80048 BASIC METABOLIC PNL TOTAL CA: CPT

## 2023-03-09 PROCEDURE — 74011000250 HC RX REV CODE- 250: Performed by: FAMILY MEDICINE

## 2023-03-09 PROCEDURE — 65270000046 HC RM TELEMETRY

## 2023-03-09 RX ADMIN — APIXABAN 5 MG: 5 TABLET, FILM COATED ORAL at 08:53

## 2023-03-09 RX ADMIN — VANCOMYCIN HYDROCHLORIDE 1250 MG: 1.25 INJECTION, POWDER, LYOPHILIZED, FOR SOLUTION INTRAVENOUS at 08:54

## 2023-03-09 RX ADMIN — SODIUM CHLORIDE, PRESERVATIVE FREE 10 ML: 5 INJECTION INTRAVENOUS at 07:00

## 2023-03-09 RX ADMIN — TRAMADOL HYDROCHLORIDE 50 MG: 50 TABLET ORAL at 04:19

## 2023-03-09 RX ADMIN — DILTIAZEM HYDROCHLORIDE 180 MG: 180 CAPSULE, COATED, EXTENDED RELEASE ORAL at 08:53

## 2023-03-09 RX ADMIN — CEFEPIME 2 G: 2 INJECTION, POWDER, FOR SOLUTION INTRAVENOUS at 16:23

## 2023-03-09 RX ADMIN — CYANOCOBALAMIN TAB 500 MCG 1000 MCG: 500 TAB at 08:54

## 2023-03-09 RX ADMIN — VANCOMYCIN HYDROCHLORIDE 1250 MG: 1.25 INJECTION, POWDER, LYOPHILIZED, FOR SOLUTION INTRAVENOUS at 22:11

## 2023-03-09 RX ADMIN — APIXABAN 5 MG: 5 TABLET, FILM COATED ORAL at 22:11

## 2023-03-09 RX ADMIN — CEFEPIME 2 G: 2 INJECTION, POWDER, FOR SOLUTION INTRAVENOUS at 04:19

## 2023-03-09 RX ADMIN — TRAMADOL HYDROCHLORIDE 50 MG: 50 TABLET ORAL at 20:33

## 2023-03-09 RX ADMIN — SODIUM CHLORIDE, PRESERVATIVE FREE 10 ML: 5 INJECTION INTRAVENOUS at 22:12

## 2023-03-09 RX ADMIN — MICONAZOLE NITRATE 2 % TOPICAL POWDER: at 18:00

## 2023-03-09 RX ADMIN — FUROSEMIDE 40 MG: 10 INJECTION, SOLUTION INTRAMUSCULAR; INTRAVENOUS at 08:54

## 2023-03-09 RX ADMIN — METOPROLOL SUCCINATE 100 MG: 50 TABLET, EXTENDED RELEASE ORAL at 08:53

## 2023-03-09 NOTE — PROGRESS NOTES
Problem: Falls - Risk of  Goal: *Absence of Falls  Description: Document Josr Muro Fall Risk and appropriate interventions in the flowsheet. Outcome: Progressing Towards Goal  Note: Fall Risk Interventions:            Medication Interventions: Bed/chair exit alarm    Elimination Interventions: Bed/chair exit alarm, Call light in reach              Problem: Patient Education: Go to Patient Education Activity  Goal: Patient/Family Education  Outcome: Progressing Towards Goal     Problem: Discharge Planning  Goal: *Discharge to safe environment  Outcome: Progressing Towards Goal  Goal: *Knowledge of medication management  Outcome: Progressing Towards Goal  Goal: *Knowledge of discharge instructions  Outcome: Progressing Towards Goal     Problem: Patient Education: Go to Patient Education Activity  Goal: Patient/Family Education  Outcome: Progressing Towards Goal     Problem: Pressure Injury - Risk of  Goal: *Prevention of pressure injury  Description: Document Wilder Scale and appropriate interventions in the flowsheet.   Outcome: Progressing Towards Goal  Note: Pressure Injury Interventions:  Sensory Interventions: Assess changes in LOC, Assess need for specialty bed, Float heels, Keep linens dry and wrinkle-free    Moisture Interventions: Absorbent underpads, Apply protective barrier, creams and emollients, Assess need for specialty bed    Activity Interventions: Increase time out of bed, PT/OT evaluation    Mobility Interventions: Float heels, HOB 30 degrees or less, Pressure redistribution bed/mattress (bed type)    Nutrition Interventions: Document food/fluid/supplement intake    Friction and Shear Interventions: Apply protective barrier, creams and emollients, Lift sheet, HOB 30 degrees or less                Problem: Patient Education: Go to Patient Education Activity  Goal: Patient/Family Education  Outcome: Progressing Towards Goal     Problem: Patient Education: Go to Patient Education Activity  Goal: Patient/Family Education  Outcome: Progressing Towards Goal     Problem: Patient Education: Go to Patient Education Activity  Goal: Patient/Family Education  Outcome: Progressing Towards Goal

## 2023-03-09 NOTE — PROGRESS NOTES
Problem: Self Care Deficits Care Plan (Adult)  Goal: *Acute Goals and Plan of Care (Insert Text)  Description:   FUNCTIONAL STATUS PRIOR TO ADMISSION: Patient was independent and active without use of DME. Able to complete ADL/iADL independently, relied on the grocery cart for support. Currently sleeping in recliner or chairs d/t sinus problem interrupting quality of sleep. HOME SUPPORT: Lives with roommates, one is in the hospital at this time    Occupational Therapy Goals  Initiated 3/6/2023  1. Patient will perform grooming with modified independence within 7 day(s). - goal met  2. Patient will perform upper body dressing with supervision/set-up within 7 day(s). - goal met  3. Patient will perform lower body dressing with moderate assistance  within 7 day(s). - goal progressing   4. Patient will perform all aspects of toileting with supervision/set-up within 7 day(s). - goal met  5. Patient will utilize energy conservation techniques during functional activities with verbal cues within 7 day(s). - goal met    3/9/2023 1333 by Americo Gaspar  Outcome: Resolved/Met     OCCUPATIONAL THERAPY TREATMENT/DISCHARGE  Patient: Bay Fox (85 y.o. male)  Date: 3/9/2023  Diagnosis: Atrial fibrillation with RVR ([facility] [address] [I48.91]  Edema of lower extremity [R60.0]  Cellulitis of left lower extremity [L03.116]  Cellulitis of right lower extremity [L03.115] <principal problem not specified>      Precautions: Fall, Skin (bariatric equipment needs)  Chart, occupational therapy assessment, plan of care, and goals were reviewed. ASSESSMENT  Patient continues with skilled OT services and has progressed towards goals. AAOx4, the patient was able to complete basic mobility and self-care in his room with set/up to modified independence. He was educated on AE for LB dressing, energy conservation and ways to assist with endurance challenges for IADLs. The patient verbalized understanding.  No further acute care OT needs, rec home with [address] [place] [address] as well as community services for grocery and meal prep. Current Level of Function (ADLs/self-care): modified independent to set/up assistance     Other factors to consider for discharge: lives with roommates, unsure of level of support they can offer         PLAN :  Rationale for discharge: Goals achieved  Recommend with staff: OOB  Recommendation for discharge: (in order for the patient to meet his/her long term goals)  Occupational therapy at least 2 days/week in the home     This discharge recommendation:  Has been made in collaboration with the attending provider and/or case management    IF patient discharges home will need the following DME:patient owns DME required for discharge       SUBJECTIVE:   Patient stated Well we look out for each other.  in reference to his roommates     OBJECTIVE DATA SUMMARY:   Cognitive/Behavioral Status:  Neurologic State: Alert  Orientation Level: Oriented X4  Cognition: Appropriate decision making; Appropriate for age attention/concentration    Functional Mobility and Transfers for ADLs:  Bed Mobility:  Supine to Sit: Supervision;Bed Modified (HOB elevated and LEs elevated)  Sit to Supine: Modified independent;Bed Modified  Scooting: Independent    Transfers:  Sit to Stand: Independent    Balance:  Sitting: Intact  Standing: Intact  Standing - Static: Good  Standing - Dynamic : Fair    ADL Intervention:  Feeding  Feeding Assistance: Set-up    Upper Body Dressing Assistance  Dressing Assistance: Independent    Lower Body Dressing Assistance  Dressing Assistance: Set-up (provided cues to the patient for AE as well as energy conservation)    Toileting  Toileting Assistance: Modified independent (urinal)  Bladder Hygiene: Modified independent  Clothing Management: Modified independent     Provided detailed energy conservation education and explanation. Explained energy in physical and visual terms \"like a water bottle that gets spent throughout the day. \" The patient was able to verbalize understanding and teach back basic principles by the end of the session. Heavy focus on pacing through activities, never starting something that can be terminated, and taking seated breaks throughout the day. Discussion of community resources, grocery delivery and other ways to simplify IADLs. Pt verbalized understanding. Pain:  None reported     Activity Tolerance:   Good    After treatment patient left in no apparent distress:   Sitting in chair and Call bell within reach    COMMUNICATION/COLLABORATION:   The patients plan of care was discussed with: Physical therapist and Registered nurse.      Eileen Sage  Time Calculation: 16 mins

## 2023-03-09 NOTE — PROGRESS NOTES
Pharmacist Note - Vancomycin Dosing  Therapy day 6  Indication: Bilateral LE cellulitis  Current regimen: 1000 mg IV Q12H    Recent Labs     03/09/23  0410 03/08/23  0340 03/07/23  0148   WBC 7.9 8.2  --    CREA 0.79 0.72 0.86   BUN 14 15 14       A random vancomycin level of 15 mcg/mL was obtained and from this level, the patient's AUC24 is calculated to be 372 with the current regimen. Goal target range AUC/MIOZ 400-500      Plan: Change to vancomycin 1250 mg IV Q12H . Pharmacy will continue to monitor this patient daily for changes in clinical status and renal function. *Random vancomycin levels are used to calculate AUC/MOIZ, this level should not be interpreted as a trough. Vancomycin has been dosed using Bayesian kinetics software to target an AUC24:MOIZ of 400-600, which provides adequate exposure for as assumed infection due to MRSA with an MOIZ of 1 or less while reducing the risk of nephrotoxicity as seen with traditional trough based dosing goals.

## 2023-03-09 NOTE — PROGRESS NOTES
Problem: Mobility Impaired (Adult and Pediatric)  Goal: *Acute Goals and Plan of Care (Insert Text)  Description: FUNCTIONAL STATUS PRIOR TO ADMISSION: Patient was independent and active without use of DME. He reported that he sleeps in a chair and was unable to tell me when the last time was that he slept in a bed. He denied any falls in the last 12 months when asked    [address] [facility]: The patient lived with roommates but did not require assist.. Physical Therapy Goals  Initiated 3/6/2023  1. Patient will move from supine to sit and sit to supine , scoot up and down, and roll side to side in bed with independence within 7 day(s). 2.  Patient will transfer from bed to chair and chair to bed with independence using the least restrictive device within 7 day(s). 3.  Patient will perform sit to stand with independence within 7 day(s). 4.  Patient will ambulate with independence for 150 feet with the least restrictive device within 7 day(s). 5.  Patient will ascend/descend 13 stairs with one handrail(s) with modified independence within 7 day(s). Outcome: Progressing Towards Goal   PHYSICAL THERAPY TREATMENT  Patient: Mat Hull (3 y.o. male)  Date: 3/9/2023  Diagnosis: Atrial fibrillation with RVR ([facility]) [I48.91]  Edema of lower extremity [R60.0]  Cellulitis of left lower extremity [L03.116]  Cellulitis of right lower extremity [L03.115] <principal problem not specified>      Precautions: Fall, Skin (bariatric equipment needs)  Chart, physical therapy assessment, plan of care and goals were reviewed. ASSESSMENT  Pt seen following RN clearance. He is motivated and agreeable to POC: bed mobility, transfers, gait training, stair training, and back to bed with BLEs elevated. Pt is progressing well with his activity tolerance, though remains limited by AROM (body habitus), strength, balance, and overall functional mobility from reported baseline status (independent).  Patient continues with skilled PT services and is progressing towards goals. He will benefit from TID walks in hallway with assist x1 for safety. PT should focus on progressing gait independence and stair tolerance as pt will have 13 steps to manage for home access (bilateral rail use). Pt in bed in NAD post session events. Anticipate pt will be appropriate for return home with HHPT w/ continued progress. He does report concerns regarding getting groceries up/down stairs- discussed with OT and she will provide some energy conservati    Recommend: TID walks in hallway with set-up/supervision. Next session: progress gait and stair training    Current Level of Function Impacting Discharge (mobility/balance): CGA    Other factors to consider for discharge: pt has 13 steps to access his home; he informed OT that his roommate will be able to assist with groceries         PLAN :  Patient continues to benefit from skilled intervention to address the above impairments. Continue treatment per established plan of care. to address goals. Recommendation for discharge: (in order for the patient to meet his/her long term goals)  Physical therapy at least 2 days/week in the home  with assistance from roommate    This discharge recommendation:  Has been made in collaboration with the attending provider and/or case management    IF patient discharges home will need the following DME: patient owns DME required for discharge       SUBJECTIVE:   Patient stated Tenzin Dark you please put that back. .. Pt has his room set-up to his preferences and does not wish for external input/assistance. Pt up ad jojo for needs around his room today.     OBJECTIVE DATA SUMMARY:   Critical Behavior:  Neurologic State: Alert  Orientation Level: Oriented X4  Cognition: Follows commands  Safety/Judgement: Awareness of environment, Fall prevention, Insight into deficits  Functional Mobility Training:  Bed Mobility:     Supine to Sit: Supervision;Bed Modified (HOB elevated and LEs elevated)  Sit to Supine: Modified independent;Bed Modified  Scooting: Independent        Transfers:  Sit to Stand: Independent  Stand to Sit: Independent                             Balance:  Sitting: Intact  Standing: Intact  Standing - Static: Good  Standing - Dynamic : Fair  Ambulation/Gait Training:  Distance (ft): 40 Feet (ft) (x2)  Assistive Device:  (none- pt reaching intermittently for wall rail support)  Ambulation - Level of Assistance: Stand-by assistance        Gait Abnormalities: Decreased step clearance;Trunk sway increased; Path deviations        Base of Support: Center of gravity altered; Widened     Speed/Connie: Slow;Pace decreased (<100 feet/min)  Step Length: Right shortened;Left shortened    Stairs:  Number of Stairs Trained: 3 (x2 trials)  Stairs - Level of Assistance: Contact guard assistance (w/ gait belt)   Rail Use: Both    Activity Tolerance:   Fair    After treatment patient left in no apparent distress:   Supine in bed, Heels elevated for pressure relief, Call bell within reach, and Side rails x 3    COMMUNICATION/COLLABORATION:   The patients plan of care was discussed with: Occupational therapist and Registered nurse.      Portillo Dover   Time Calculation: 19 mins

## 2023-03-09 NOTE — PROGRESS NOTES
Bedside and Verbal shift change report given to RN (oncoming nurse) by Breana Gomez (offgoing nurse). Report included the following information SBAR, Kardex, Procedure Summary, MAR, and Recent Results.

## 2023-03-10 LAB
ANION GAP SERPL CALC-SCNC: 5 MMOL/L (ref 5–15)
BUN SERPL-MCNC: 12 MG/DL (ref 6–20)
BUN/CREAT SERPL: 16 (ref 12–20)
CALCIUM SERPL-MCNC: 8.5 MG/DL (ref 8.5–10.1)
CHLORIDE SERPL-SCNC: 105 MMOL/L (ref 97–108)
CO2 SERPL-SCNC: 29 MMOL/L (ref 21–32)
CREAT SERPL-MCNC: 0.74 MG/DL (ref 0.7–1.3)
ERYTHROCYTE [DISTWIDTH] IN BLOOD BY AUTOMATED COUNT: 14.4 % (ref 11.5–14.5)
GLUCOSE BLD STRIP.AUTO-MCNC: 112 MG/DL (ref 65–117)
GLUCOSE BLD STRIP.AUTO-MCNC: 119 MG/DL (ref 65–117)
GLUCOSE BLD STRIP.AUTO-MCNC: 140 MG/DL (ref 65–117)
GLUCOSE BLD STRIP.AUTO-MCNC: 92 MG/DL (ref 65–117)
GLUCOSE SERPL-MCNC: 96 MG/DL (ref 65–100)
HCT VFR BLD AUTO: 42.7 % (ref 36.6–50.3)
HGB BLD-MCNC: 13.3 G/DL (ref 12.1–17)
MCH RBC QN AUTO: 31.1 PG (ref 26–34)
MCHC RBC AUTO-ENTMCNC: 31.1 G/DL (ref 30–36.5)
MCV RBC AUTO: 99.8 FL (ref 80–99)
NRBC # BLD: 0 K/UL (ref 0–0.01)
NRBC BLD-RTO: 0 PER 100 WBC
PLATELET # BLD AUTO: 231 K/UL (ref 150–400)
PMV BLD AUTO: 11.6 FL (ref 8.9–12.9)
POTASSIUM SERPL-SCNC: 4.4 MMOL/L (ref 3.5–5.1)
RBC # BLD AUTO: 4.28 M/UL (ref 4.1–5.7)
SERVICE CMNT-IMP: ABNORMAL
SERVICE CMNT-IMP: ABNORMAL
SERVICE CMNT-IMP: NORMAL
SERVICE CMNT-IMP: NORMAL
SODIUM SERPL-SCNC: 139 MMOL/L (ref 136–145)
WBC # BLD AUTO: 8 K/UL (ref 4.1–11.1)

## 2023-03-10 PROCEDURE — 85027 COMPLETE CBC AUTOMATED: CPT

## 2023-03-10 PROCEDURE — 36415 COLL VENOUS BLD VENIPUNCTURE: CPT

## 2023-03-10 PROCEDURE — 74011250636 HC RX REV CODE- 250/636: Performed by: HOSPITALIST

## 2023-03-10 PROCEDURE — 74011250637 HC RX REV CODE- 250/637: Performed by: HOSPITALIST

## 2023-03-10 PROCEDURE — 82962 GLUCOSE BLOOD TEST: CPT

## 2023-03-10 PROCEDURE — 74011636637 HC RX REV CODE- 636/637: Performed by: FAMILY MEDICINE

## 2023-03-10 PROCEDURE — 97116 GAIT TRAINING THERAPY: CPT

## 2023-03-10 PROCEDURE — 74011250637 HC RX REV CODE- 250/637: Performed by: FAMILY MEDICINE

## 2023-03-10 PROCEDURE — 74011000258 HC RX REV CODE- 258: Performed by: HOSPITALIST

## 2023-03-10 PROCEDURE — 80048 BASIC METABOLIC PNL TOTAL CA: CPT

## 2023-03-10 PROCEDURE — 65270000046 HC RM TELEMETRY

## 2023-03-10 PROCEDURE — 74011000250 HC RX REV CODE- 250: Performed by: FAMILY MEDICINE

## 2023-03-10 RX ADMIN — APIXABAN 5 MG: 5 TABLET, FILM COATED ORAL at 09:10

## 2023-03-10 RX ADMIN — SODIUM CHLORIDE, PRESERVATIVE FREE 10 ML: 5 INJECTION INTRAVENOUS at 06:00

## 2023-03-10 RX ADMIN — VANCOMYCIN HYDROCHLORIDE 1250 MG: 1.25 INJECTION, POWDER, LYOPHILIZED, FOR SOLUTION INTRAVENOUS at 11:22

## 2023-03-10 RX ADMIN — MICONAZOLE NITRATE 2 % TOPICAL POWDER: at 09:00

## 2023-03-10 RX ADMIN — SODIUM CHLORIDE, PRESERVATIVE FREE 10 ML: 5 INJECTION INTRAVENOUS at 22:33

## 2023-03-10 RX ADMIN — APIXABAN 5 MG: 5 TABLET, FILM COATED ORAL at 22:32

## 2023-03-10 RX ADMIN — Medication 2 UNITS: at 17:51

## 2023-03-10 RX ADMIN — FUROSEMIDE 40 MG: 10 INJECTION, SOLUTION INTRAMUSCULAR; INTRAVENOUS at 11:23

## 2023-03-10 RX ADMIN — CEFEPIME 2 G: 2 INJECTION, POWDER, FOR SOLUTION INTRAVENOUS at 04:04

## 2023-03-10 RX ADMIN — SODIUM CHLORIDE, PRESERVATIVE FREE 10 ML: 5 INJECTION INTRAVENOUS at 14:55

## 2023-03-10 RX ADMIN — TRAMADOL HYDROCHLORIDE 50 MG: 50 TABLET ORAL at 04:04

## 2023-03-10 RX ADMIN — MICONAZOLE NITRATE 2 % TOPICAL POWDER: at 19:04

## 2023-03-10 RX ADMIN — CEFEPIME 2 G: 2 INJECTION, POWDER, FOR SOLUTION INTRAVENOUS at 15:28

## 2023-03-10 RX ADMIN — DILTIAZEM HYDROCHLORIDE 180 MG: 180 CAPSULE, COATED, EXTENDED RELEASE ORAL at 09:09

## 2023-03-10 RX ADMIN — CYANOCOBALAMIN TAB 500 MCG 1000 MCG: 500 TAB at 09:09

## 2023-03-10 RX ADMIN — METOPROLOL SUCCINATE 100 MG: 50 TABLET, EXTENDED RELEASE ORAL at 09:09

## 2023-03-10 RX ADMIN — VANCOMYCIN HYDROCHLORIDE 1250 MG: 1.25 INJECTION, POWDER, LYOPHILIZED, FOR SOLUTION INTRAVENOUS at 22:32

## 2023-03-10 RX ADMIN — TRAMADOL HYDROCHLORIDE 50 MG: 50 TABLET ORAL at 22:47

## 2023-03-10 NOTE — PROGRESS NOTES
Problem: Falls - Risk of  Goal: *Absence of Falls  Description: Document Antoinette Mcnair Fall Risk and appropriate interventions in the flowsheet. Outcome: Progressing Towards Goal  Note: Fall Risk Interventions:            Medication Interventions: Bed/chair exit alarm    Elimination Interventions: Bed/chair exit alarm, Call light in reach              Problem: Patient Education: Go to Patient Education Activity  Goal: Patient/Family Education  Outcome: Progressing Towards Goal     Problem: Discharge Planning  Goal: *Discharge to safe environment  Outcome: Progressing Towards Goal  Goal: *Knowledge of medication management  Outcome: Progressing Towards Goal  Goal: *Knowledge of discharge instructions  Outcome: Progressing Towards Goal     Problem: Patient Education: Go to Patient Education Activity  Goal: Patient/Family Education  Outcome: Progressing Towards Goal     Problem: Pressure Injury - Risk of  Goal: *Prevention of pressure injury  Description: Document Wilder Scale and appropriate interventions in the flowsheet.   Outcome: Progressing Towards Goal  Note: Pressure Injury Interventions:  Sensory Interventions: Assess need for specialty bed, Assess changes in LOC, Avoid rigorous massage over bony prominences, Check visual cues for pain, Maintain/enhance activity level, Monitor skin under medical devices, Minimize linen layers, Keep linens dry and wrinkle-free, Float heels, Discuss PT/OT consult with provider    Moisture Interventions: Absorbent underpads, Apply protective barrier, creams and emollients, Assess need for specialty bed    Activity Interventions: Increase time out of bed, Pressure redistribution bed/mattress(bed type)    Mobility Interventions: Float heels, HOB 30 degrees or less, Pressure redistribution bed/mattress (bed type)    Nutrition Interventions: Document food/fluid/supplement intake, Offer support with meals,snacks and hydration    Friction and Shear Interventions: Apply protective barrier, creams and emollients, Foam dressings/transparent film/skin sealants, HOB 30 degrees or less, Lift sheet                Problem: Patient Education: Go to Patient Education Activity  Goal: Patient/Family Education  Outcome: Progressing Towards Goal     Problem: Patient Education: Go to Patient Education Activity  Goal: Patient/Family Education  Outcome: Progressing Towards Goal     Problem: Patient Education: Go to Patient Education Activity  Goal: Patient/Family Education  Outcome: Progressing Towards Goal

## 2023-03-10 NOTE — PROGRESS NOTES
Problem: Mobility Impaired (Adult and Pediatric)  Goal: *Acute Goals and Plan of Care (Insert Text)  Description: FUNCTIONAL STATUS PRIOR TO ADMISSION: Patient was independent and active without use of DME. He reported that he sleeps in a chair and was unable to tell me when the last time was that he slept in a bed. He denied any falls in the last 12 months when asked    [address]: The patient lived with roommates but did not require assist.. Physical Therapy Goals  Initiated 3/6/2023  1. Patient will move from supine to sit and sit to supine , scoot up and down, and roll side to side in bed with independence within 7 day(s). 2.  Patient will transfer from bed to chair and chair to bed with independence using the least restrictive device within 7 day(s). 3.  Patient will perform sit to stand with independence within 7 day(s). 4.  Patient will ambulate with independence for 150 feet with the least restrictive device within 7 day(s). 5.  Patient will ascend/descend 13 stairs with one handrail(s) with modified independence within 7 day(s). Outcome: Progressing Towards Goal   PHYSICAL THERAPY TREATMENT  Patient: Nena Brock (14 y.o. male)  Date: 3/10/2023  Diagnosis: Atrial fibrillation with RVR (HCC) [I48.91]  Edema of lower extremity [R60.0]  Cellulitis of left lower extremity [L03.116]  Cellulitis of right lower extremity [L03.115] <principal problem not specified>      Precautions: Fall, Skin (bariatric equipment needs)  Chart, physical therapy assessment, plan of care and goals were reviewed. ASSESSMENT  Patient continues with skilled PT services and is progressing towards goals. Patient received in chair, motivated for therapy. Pt required increased time with ambulation, yet no overt LOB noted. Pt able to negotiate steps with step to pattern and fair steadiness. Educated pt to continue mobilizing with nursing to prevent deconditioning.  Anticipate HHPT upon discharge to continue progressing toward functional independence. Current Level of Function Impacting Discharge (mobility/balance): supervision ambulation    Other factors to consider for discharge: fall risk, lives alone         PLAN :  Patient continues to benefit from skilled intervention to address the above impairments. Continue treatment per established plan of care. to address goals. Recommendation for discharge: (in order for the patient to meet his/her long term goals)  Physical therapy at least 2 days/week in the home     This discharge recommendation:  Has been made in collaboration with the attending provider and/or case management    IF patient discharges home will need the following DME: none       SUBJECTIVE:   Patient stated I used to walk 5-10 miles to work back in the day.     OBJECTIVE DATA SUMMARY:   Critical Behavior:  Neurologic State: Alert  Orientation Level: Oriented X4  Cognition: Appropriate decision making, Appropriate for age attention/concentration, Appropriate safety awareness, Follows commands  Safety/Judgement: Awareness of environment, Fall prevention, Insight into deficits  Functional Mobility Training:  Bed Mobility:                    Transfers:  Sit to Stand: Modified independent  Stand to Sit: Modified independent                             Balance:  Sitting: Intact  Standing: Intact  Ambulation/Gait Training:  Distance (ft): 250 Feet (ft)  Assistive Device: Gait belt  Ambulation - Level of Assistance: Supervision        Gait Abnormalities: Decreased step clearance        Base of Support: Widened     Speed/Connie: Pace decreased (<100 feet/min); Slow  Step Length: Right shortened;Left shortened           Stairs:  Number of Stairs Trained: 3  Stairs - Level of Assistance: Supervision   Rail Use: Both        Activity Tolerance:   Good    After treatment patient left in no apparent distress:   Sitting in chair and Call bell within reach    COMMUNICATION/COLLABORATION:   The patients plan of care was discussed with: Registered nurse, Physician, and Case management.      Frankey Canavan, PT   Time Calculation: 13 mins

## 2023-03-10 NOTE — PROGRESS NOTES
Hospitalist Progress Note  Vicky Dandy, MD  Answering service: [phone] from in house phone        Date of Service:  3/10/2023  NAME:  Bay Fox  :    MRN:  [id number]      Admission Summary:   Bay Fox is a 75 y.o. male past medical history of atrial fibrillation, long-term anticoagulation therapy on Eliquis, lower extremity edema, cellulitis, hypertension, type 2 diabetes mellitus, scrotal hernia, and obesity presented to the emergency department via EMS from home with chief complaints of unhealed wounds. Patient has multiple wounds of bilateral lower extremities with history of chronic venous stasis. Per reports patient's bilateral lower extremity wounds have been weeping with pus over the last 7 days with increased swelling edema both legs to severe, constant, without specific alleviating factors. There is no reports of fever. On arrival emergency department, initial reported vital signs temperature 98.4 °F, /105, heart rate 113, respiratory rate 20, O2 saturations 99% on room air. Left and right tib-fib fib x-ray showed bilateral lower extremity edema, minimal periosteal reaction along tibia and fibular diaphysis questionable for stress reaction or infection. 12 lead EKG showed atrial fibrillation with rapid ventricular response at 107 bpm.  ED administered diltiazem 20 mg IV x1 dose, Tylenol 650 mg p.o. x1, vancomycin 2500 mg IV x1 dose. Patient is now seen for admission to the hospitalist service. Patient complains of severe pain associated with leg swelling and edema. Despite the same, he notes he is ambulatory without assistance. In addition, he has scrotal open wounds which she notes are chronic and had hernia associated with scrotal swelling for several years.           Interval history / Subjective:        Patient seen and examined, he was walking on the hallway with PT. No complaints. Assessment & Plan:     Atrial fibrillation with RVR   -HR control achieved with IV diltiazem. Continue with p.o. diltiazem, metoprolol succinate. Anticoagulated with apixaban.  -Echo  EF 45-50%   -Cardiology consulted and following. Acute on chronic systolic/diastolic congestive heart failure NYHA class IV. EF 45 to 50%  -Placed on strict I's and O's and daily weights  - Lasix 40 mg IV daily, continue as long renal funciton tolerate   -GDMT per cardiology. Chronic lower extremity venous stasis, chronic nonhealing ulcer complicated by cellulitis of right lower extremity   -Continue IV antibiotics, IV diuretics.  -Continue local wound care     Type 2 diabetes mellitus-A1c 3.5  -Order Humalog insulin correctional coverage, scheduled blood glucose checks      Essential hypertension  -Continue metoprolol, diltiazem     Class III obesity  -BMI 37.31 kg/M2  -Would encourage weight loss, reduced calorie diet, lifestyle modifications    Suprapubic catheter     Massive right inguinal hernia. Patient denies any complaints, states he has lived with it for so long it does not bother him. Code status: full   Prophylaxis: eliquis   Care Plan discussed with: Case discussed with patient, IDR team.  Anticipated Disposition: Home with [facility] [place] PT in 1-2 days     Hospital Problems  Never Reviewed            Codes Class Noted POA    Atrial fibrillation with RVR ([facility] [address] ICD-10-CM: I48.91  ICD-9-CM: 427.31  3/3/2023 Unknown        Edema of lower extremity ICD-10-CM: R60.0  ICD-9-CM: 782.3  3/3/2023 Unknown        Cellulitis of right lower extremity ICD-10-CM: L03.115  ICD-9-CM: 682.6  3/3/2023 Unknown        Cellulitis of left lower extremity ICD-10-CM: L03.116  ICD-9-CM: 682.6  3/3/2023 Unknown       Review of Systems:   A comprehensive review of systems was negative except for that written in the HPI. Vital Signs:    Last 24hrs VS reviewed since prior progress note.  Most recent are:  Visit Vitals  /67 (BP 1 Location: Left upper arm, BP Patient Position: Sitting)   Pulse 92   Temp 97.7 °F (36.5 °C)   Resp 16   Ht 5' 10\" (1.778 m)   Wt 136.2 kg (300 lb 4.3 oz)   SpO2 96%   BMI 43.08 kg/m²         Intake/Output Summary (Last 24 hours) at 3/10/2023 1859  Last data filed at 3/10/2023 1738  Gross per 24 hour   Intake --   Output 1450 ml   Net -1450 ml          Physical Examination:     I had a face to face encounter with this patient and independently examined them on 3/10/2023 as outlined below:          General : Patient seen sitting in a chair by the bedside, alert and oriented. HEENT: PEERL, EOMI, moist mucus membrane, TM clear  Neck: supple, no JVD, no meningeal signs  Chest: Clear to auscultation bilaterally   CVS: S1 S2 heard, Capillary refill less than 2 seconds  Abd: soft, large inguinal hernia, suprapuic catheter, mild red groin folded shon   Ext:Extensive, severe diffuse erythema, tenderness, swelling of both legs with large open stage II wound on the posterior aspect distal right leg  Stage II large open wound on scrotum  Edema improved     Neuro/Psych:no focal weakness. Skin: warm            Data Review:    Review and/or order of clinical lab test    I have independently reviewed and interpreted patient's lab and all other diagnostic data    Notes reviewed from all clinical/nonclinical/nursing services involved in patient's clinical care. Care coordination discussions were held with appropriate clinical/nonclinical/ nursing providers based on care coordination needs.      Labs:     Recent Labs     03/10/23  0702 03/09/23  0410   WBC 8.0 7.9   HGB 13.3 12.4   HCT 42.7 39.1    219       Recent Labs     03/10/23  0702 03/09/23  0410 03/08/23  0340    139 139   K 4.4 4.2 3.7    104 103   CO2 29 29 30   BUN 12 14 15   CREA 0.74 0.79 0.72   GLU 96 94 92   CA 8.5 8.2* 8.6       No results for input(s): ALT, AP, TBIL, TBILI, TP, ALB, GLOB, GGT, AML, LPSE in the last 72 hours. No lab exists for component: SGOT, GPT, AMYP, HLPSE    No results for input(s): INR, PTP, APTT, INREXT, INREXT in the last 72 hours. No results for input(s): FE, TIBC, PSAT, FERR in the last 72 hours. No results found for: FOL, RBCF   No results for input(s): PH, PCO2, PO2 in the last 72 hours. No results for input(s): CPK, CKNDX, TROIQ in the last 72 hours.     No lab exists for component: CPKMB  No results found for: CHOL, CHOLX, CHLST, CHOLV, HDL, HDLP, LDL, LDLC, DLDLP, TGLX, TRIGL, TRIGP, CHHD, CHHDX  Lab Results   Component Value Date/Time    Glucose (POC) 140 (H) 03/10/2023 04:16 PM    Glucose (POC) 119 (H) 03/10/2023 12:20 PM    Glucose (POC) 92 03/10/2023 07:26 AM    Glucose (POC) 97 03/09/2023 10:04 PM    Glucose (POC) 99 03/09/2023 05:13 PM     No results found for: COLOR, APPRN, SPGRU, REFSG, HUBER, PROTU, GLUCU, KETU, BILU, UROU, SHARON, LEUKU, GLUKE, EPSU, BACTU, WBCU, RBCU, CASTS, UCRY      Medications Reviewed:     Current Facility-Administered Medications   Medication Dose Route Frequency    [START ON 3/11/2023] Vancomycin random 3/11; please draw at least 4 hours AFTER start of vancomycin infusion   Other ONCE    vancomycin (VANCOCIN) 1,250 mg in 0.9% sodium chloride 250 mL ([id number])  1,250 mg IntraVENous Q12H    traMADoL (ULTRAM) tablet 50 mg  50 mg Oral Q6H PRN    dilTIAZem ER (CARDIZEM CD) capsule 180 mg  180 mg Oral DAILY    apixaban (ELIQUIS) tablet 5 mg  5 mg Oral Q12H    ergocalciferol capsule 50,000 Units  50,000 Units Oral every Saturday    metoprolol succinate (TOPROL-XL) XL tablet 100 mg  100 mg Oral DAILY    cyanocobalamin (VITAMIN B12) tablet 1,000 mcg  1,000 mcg Oral DAILY    glucose chewable tablet 16 g  4 Tablet Oral PRN    glucagon (GLUCAGEN) injection 1 mg  1 mg IntraMUSCular PRN    dextrose 10 % infusion 0-250 mL  0-250 mL IntraVENous PRN    insulin lispro (HUMALOG) injection   SubCUTAneous AC&HS    sodium chloride (NS) flush 5-40 mL  5-40 mL IntraVENous Q8H sodium chloride (NS) flush 5-40 mL  5-40 mL IntraVENous PRN    acetaminophen (TYLENOL) tablet 650 mg  650 mg Oral Q6H PRN    Or    acetaminophen (TYLENOL) suppository 650 mg  650 mg Rectal Q6H PRN    polyethylene glycol (MIRALAX) packet 17 g  17 g Oral DAILY PRN    ondansetron (ZOFRAN ODT) tablet 4 mg  4 mg Oral Q8H PRN    Or    ondansetron (ZOFRAN) injection 4 mg  4 mg IntraVENous Q6H PRN    morphine injection 4 mg  4 mg IntraVENous Q4H PRN    vancomycin - pharmacy to dose   Other Rx Dosing/Monitoring    cefepime (MAXIPIME) 2 g in 0.9% sodium chloride (MBP/ADV) 100 mL MBP  2 g IntraVENous Q12H    furosemide (LASIX) injection 40 mg  40 mg IntraVENous DAILY    miconazole (MICOTIN) 2 % powder   Topical BID     ______________________________________________________________________  EXPECTED LENGTH OF STAY: 3d 9h  ACTUAL LENGTH OF STAY:          7                 Edouard Krishna MD

## 2023-03-11 LAB
ANION GAP SERPL CALC-SCNC: 7 MMOL/L (ref 5–15)
BUN SERPL-MCNC: 13 MG/DL (ref 6–20)
BUN/CREAT SERPL: 17 (ref 12–20)
CALCIUM SERPL-MCNC: 8.8 MG/DL (ref 8.5–10.1)
CHLORIDE SERPL-SCNC: 102 MMOL/L (ref 97–108)
CO2 SERPL-SCNC: 32 MMOL/L (ref 21–32)
CREAT SERPL-MCNC: 0.76 MG/DL (ref 0.7–1.3)
ERYTHROCYTE [DISTWIDTH] IN BLOOD BY AUTOMATED COUNT: 14.5 % (ref 11.5–14.5)
GLUCOSE BLD STRIP.AUTO-MCNC: 106 MG/DL (ref 65–117)
GLUCOSE BLD STRIP.AUTO-MCNC: 108 MG/DL (ref 65–117)
GLUCOSE BLD STRIP.AUTO-MCNC: 108 MG/DL (ref 65–117)
GLUCOSE BLD STRIP.AUTO-MCNC: 94 MG/DL (ref 65–117)
GLUCOSE SERPL-MCNC: 90 MG/DL (ref 65–100)
HCT VFR BLD AUTO: 42.1 % (ref 36.6–50.3)
HGB BLD-MCNC: 13.3 G/DL (ref 12.1–17)
MCH RBC QN AUTO: 30.9 PG (ref 26–34)
MCHC RBC AUTO-ENTMCNC: 31.6 G/DL (ref 30–36.5)
MCV RBC AUTO: 97.9 FL (ref 80–99)
NRBC # BLD: 0 K/UL (ref 0–0.01)
NRBC BLD-RTO: 0 PER 100 WBC
PLATELET # BLD AUTO: 234 K/UL (ref 150–400)
PMV BLD AUTO: 11.5 FL (ref 8.9–12.9)
POTASSIUM SERPL-SCNC: 3.8 MMOL/L (ref 3.5–5.1)
RBC # BLD AUTO: 4.3 M/UL (ref 4.1–5.7)
SERVICE CMNT-IMP: NORMAL
SODIUM SERPL-SCNC: 141 MMOL/L (ref 136–145)
VANCOMYCIN SERPL-MCNC: 19.5 UG/ML
WBC # BLD AUTO: 7.5 K/UL (ref 4.1–11.1)

## 2023-03-11 PROCEDURE — 74011250637 HC RX REV CODE- 250/637: Performed by: FAMILY MEDICINE

## 2023-03-11 PROCEDURE — 80202 ASSAY OF VANCOMYCIN: CPT

## 2023-03-11 PROCEDURE — 74011250637 HC RX REV CODE- 250/637: Performed by: HOSPITALIST

## 2023-03-11 PROCEDURE — 74011000258 HC RX REV CODE- 258: Performed by: HOSPITALIST

## 2023-03-11 PROCEDURE — 74011000250 HC RX REV CODE- 250: Performed by: FAMILY MEDICINE

## 2023-03-11 PROCEDURE — 85027 COMPLETE CBC AUTOMATED: CPT

## 2023-03-11 PROCEDURE — 74011250636 HC RX REV CODE- 250/636: Performed by: HOSPITALIST

## 2023-03-11 PROCEDURE — 65270000046 HC RM TELEMETRY

## 2023-03-11 PROCEDURE — 82962 GLUCOSE BLOOD TEST: CPT

## 2023-03-11 PROCEDURE — 36415 COLL VENOUS BLD VENIPUNCTURE: CPT

## 2023-03-11 PROCEDURE — 80048 BASIC METABOLIC PNL TOTAL CA: CPT

## 2023-03-11 RX ORDER — FUROSEMIDE 10 MG/ML
40 INJECTION INTRAMUSCULAR; INTRAVENOUS 2 TIMES DAILY
Status: DISCONTINUED | OUTPATIENT
Start: 2023-03-11 | End: 2023-03-13

## 2023-03-11 RX ADMIN — CYANOCOBALAMIN TAB 500 MCG 1000 MCG: 500 TAB at 09:18

## 2023-03-11 RX ADMIN — DILTIAZEM HYDROCHLORIDE 180 MG: 180 CAPSULE, COATED, EXTENDED RELEASE ORAL at 09:18

## 2023-03-11 RX ADMIN — SODIUM CHLORIDE, PRESERVATIVE FREE 10 ML: 5 INJECTION INTRAVENOUS at 21:25

## 2023-03-11 RX ADMIN — MICONAZOLE NITRATE 2 % TOPICAL POWDER: at 17:05

## 2023-03-11 RX ADMIN — ERGOCALCIFEROL 50000 UNITS: 1.25 CAPSULE ORAL at 09:18

## 2023-03-11 RX ADMIN — CEFEPIME 2 G: 2 INJECTION, POWDER, FOR SOLUTION INTRAVENOUS at 14:44

## 2023-03-11 RX ADMIN — APIXABAN 5 MG: 5 TABLET, FILM COATED ORAL at 09:18

## 2023-03-11 RX ADMIN — METOPROLOL SUCCINATE 100 MG: 50 TABLET, EXTENDED RELEASE ORAL at 09:18

## 2023-03-11 RX ADMIN — FUROSEMIDE 40 MG: 10 INJECTION, SOLUTION INTRAMUSCULAR; INTRAVENOUS at 09:18

## 2023-03-11 RX ADMIN — MICONAZOLE NITRATE 2 % TOPICAL POWDER: at 09:24

## 2023-03-11 RX ADMIN — TRAMADOL HYDROCHLORIDE 50 MG: 50 TABLET ORAL at 22:41

## 2023-03-11 RX ADMIN — SODIUM CHLORIDE, PRESERVATIVE FREE 10 ML: 5 INJECTION INTRAVENOUS at 06:33

## 2023-03-11 RX ADMIN — FUROSEMIDE 40 MG: 10 INJECTION, SOLUTION INTRAVENOUS at 19:25

## 2023-03-11 RX ADMIN — TRAMADOL HYDROCHLORIDE 50 MG: 50 TABLET ORAL at 09:23

## 2023-03-11 RX ADMIN — VANCOMYCIN HYDROCHLORIDE 1250 MG: 1.25 INJECTION, POWDER, LYOPHILIZED, FOR SOLUTION INTRAVENOUS at 21:24

## 2023-03-11 RX ADMIN — SODIUM CHLORIDE, PRESERVATIVE FREE 10 ML: 5 INJECTION INTRAVENOUS at 14:00

## 2023-03-11 RX ADMIN — VANCOMYCIN HYDROCHLORIDE 1250 MG: 1.25 INJECTION, POWDER, LYOPHILIZED, FOR SOLUTION INTRAVENOUS at 09:17

## 2023-03-11 RX ADMIN — CEFEPIME 2 G: 2 INJECTION, POWDER, FOR SOLUTION INTRAVENOUS at 02:57

## 2023-03-11 RX ADMIN — APIXABAN 5 MG: 5 TABLET, FILM COATED ORAL at 21:24

## 2023-03-11 NOTE — PROGRESS NOTES
Problem: Falls - Risk of  Goal: *Absence of Falls  Description: Document Zandra Kimberleyalejandro Fall Risk and appropriate interventions in the flowsheet. Outcome: Progressing Towards Goal  Note: Fall Risk Interventions:            Medication Interventions: Bed/chair exit alarm    Elimination Interventions: Bed/chair exit alarm              Problem: Pressure Injury - Risk of  Goal: *Prevention of pressure injury  Description: Document Wilder Scale and appropriate interventions in the flowsheet.   Outcome: Progressing Towards Goal  Note: Pressure Injury Interventions:  Sensory Interventions: Assess changes in LOC, Use 30-degree side-lying position    Moisture Interventions: Absorbent underpads    Activity Interventions: Increase time out of bed    Mobility Interventions: Float heels    Nutrition Interventions: Document food/fluid/supplement intake    Friction and Shear Interventions: Apply protective barrier, creams and emollients                Problem: Patient Education: Go to Patient Education Activity  Goal: Patient/Family Education  Outcome: Progressing Towards Goal

## 2023-03-11 NOTE — PROGRESS NOTES
Day #8 of Vancomycin  Indication:  Bilateral LE cellulitis  Current regimen:  1250 mg IV Q 12 hr  Abx regimen:  Cefepime + Vancomycin  ID Following ?: NO  Concomitant nephrotoxic drugs (requires more frequent monitoring): Loop diuretics  Frequency of BMP?: Daily through 3/14    Recent Labs     23  0715 03/10/23  0702 23  0410   WBC 7.5 8.0 7.9   CREA 0.76 0.74 0.79   BUN 13 12 14     Est CrCl: >100 ml/min; UO: 0.8 ml/kg/hr  Temp (24hrs), Av.2 °F (36.8 °C), Min:97.7 °F (36.5 °C), Max:98.9 °F (37.2 °C)    Cultures:   3/3 Blood: NGTD, final    MRSA Swab ordered (if applicable)? N/A    Goal target range AUC/MOIZ 400-500    Recent level history:  Date/Time Dose & Interval Measured Level (mcg/mL) Associated AUC/MOIZ Dose Adjustment    3/6  1250 mg IV Q 12 hours 21 566 1 gram IV Q 12 hours   3/9 1000 mg IV Q12H 15 372 1250 mg IV Q12H   3/10 1250mg q12h 19.5 (~8.75 hrs p dose) 475 None                             Plan: Continue current regimen; AUC within goal. Renal fxn stable. Can hopefully d/c vancomycin in the next 1-2 days. WBC WNL, afebrile, cx negative.

## 2023-03-11 NOTE — PROGRESS NOTES
Verbal shift change report given to Joe Fernandes (oncoming nurse) by Sydna Heimlich (offgoing nurse). Report included the following information SBAR, Kardex, and Recent Results.

## 2023-03-12 LAB
ANION GAP SERPL CALC-SCNC: 7 MMOL/L (ref 5–15)
BUN SERPL-MCNC: 14 MG/DL (ref 6–20)
BUN/CREAT SERPL: 19 (ref 12–20)
CALCIUM SERPL-MCNC: 8.5 MG/DL (ref 8.5–10.1)
CHLORIDE SERPL-SCNC: 102 MMOL/L (ref 97–108)
CO2 SERPL-SCNC: 32 MMOL/L (ref 21–32)
CREAT SERPL-MCNC: 0.74 MG/DL (ref 0.7–1.3)
ERYTHROCYTE [DISTWIDTH] IN BLOOD BY AUTOMATED COUNT: 14.4 % (ref 11.5–14.5)
GLUCOSE BLD STRIP.AUTO-MCNC: 107 MG/DL (ref 65–117)
GLUCOSE BLD STRIP.AUTO-MCNC: 112 MG/DL (ref 65–117)
GLUCOSE BLD STRIP.AUTO-MCNC: 160 MG/DL (ref 65–117)
GLUCOSE BLD STRIP.AUTO-MCNC: 89 MG/DL (ref 65–117)
GLUCOSE SERPL-MCNC: 90 MG/DL (ref 65–100)
HCT VFR BLD AUTO: 41.5 % (ref 36.6–50.3)
HGB BLD-MCNC: 12.9 G/DL (ref 12.1–17)
MCH RBC QN AUTO: 30.6 PG (ref 26–34)
MCHC RBC AUTO-ENTMCNC: 31.1 G/DL (ref 30–36.5)
MCV RBC AUTO: 98.6 FL (ref 80–99)
NRBC # BLD: 0 K/UL (ref 0–0.01)
NRBC BLD-RTO: 0 PER 100 WBC
PLATELET # BLD AUTO: 233 K/UL (ref 150–400)
PMV BLD AUTO: 11.6 FL (ref 8.9–12.9)
POTASSIUM SERPL-SCNC: 3.4 MMOL/L (ref 3.5–5.1)
RBC # BLD AUTO: 4.21 M/UL (ref 4.1–5.7)
SERVICE CMNT-IMP: ABNORMAL
SERVICE CMNT-IMP: NORMAL
SODIUM SERPL-SCNC: 141 MMOL/L (ref 136–145)
WBC # BLD AUTO: 8.3 K/UL (ref 4.1–11.1)

## 2023-03-12 PROCEDURE — 74011250636 HC RX REV CODE- 250/636: Performed by: HOSPITALIST

## 2023-03-12 PROCEDURE — 74011000258 HC RX REV CODE- 258: Performed by: HOSPITALIST

## 2023-03-12 PROCEDURE — 74011250637 HC RX REV CODE- 250/637: Performed by: FAMILY MEDICINE

## 2023-03-12 PROCEDURE — 82962 GLUCOSE BLOOD TEST: CPT

## 2023-03-12 PROCEDURE — 65270000046 HC RM TELEMETRY

## 2023-03-12 PROCEDURE — 74011000250 HC RX REV CODE- 250: Performed by: FAMILY MEDICINE

## 2023-03-12 PROCEDURE — 74011250637 HC RX REV CODE- 250/637: Performed by: HOSPITALIST

## 2023-03-12 PROCEDURE — 85027 COMPLETE CBC AUTOMATED: CPT

## 2023-03-12 PROCEDURE — 80048 BASIC METABOLIC PNL TOTAL CA: CPT

## 2023-03-12 PROCEDURE — 36415 COLL VENOUS BLD VENIPUNCTURE: CPT

## 2023-03-12 RX ORDER — POTASSIUM CHLORIDE 750 MG/1
40 TABLET, FILM COATED, EXTENDED RELEASE ORAL DAILY
Status: COMPLETED | OUTPATIENT
Start: 2023-03-12 | End: 2023-03-14

## 2023-03-12 RX ADMIN — FUROSEMIDE 40 MG: 10 INJECTION, SOLUTION INTRAVENOUS at 09:19

## 2023-03-12 RX ADMIN — CYANOCOBALAMIN TAB 500 MCG 1000 MCG: 500 TAB at 09:18

## 2023-03-12 RX ADMIN — POTASSIUM CHLORIDE 40 MEQ: 750 TABLET, FILM COATED, EXTENDED RELEASE ORAL at 10:25

## 2023-03-12 RX ADMIN — SODIUM CHLORIDE, PRESERVATIVE FREE 10 ML: 5 INJECTION INTRAVENOUS at 22:04

## 2023-03-12 RX ADMIN — CEFEPIME 2 G: 2 INJECTION, POWDER, FOR SOLUTION INTRAVENOUS at 03:41

## 2023-03-12 RX ADMIN — SODIUM CHLORIDE, PRESERVATIVE FREE 10 ML: 5 INJECTION INTRAVENOUS at 14:31

## 2023-03-12 RX ADMIN — APIXABAN 5 MG: 5 TABLET, FILM COATED ORAL at 09:18

## 2023-03-12 RX ADMIN — METOPROLOL SUCCINATE 100 MG: 50 TABLET, EXTENDED RELEASE ORAL at 09:18

## 2023-03-12 RX ADMIN — VANCOMYCIN HYDROCHLORIDE 1250 MG: 1.25 INJECTION, POWDER, LYOPHILIZED, FOR SOLUTION INTRAVENOUS at 22:04

## 2023-03-12 RX ADMIN — APIXABAN 5 MG: 5 TABLET, FILM COATED ORAL at 22:04

## 2023-03-12 RX ADMIN — VANCOMYCIN HYDROCHLORIDE 1250 MG: 1.25 INJECTION, POWDER, LYOPHILIZED, FOR SOLUTION INTRAVENOUS at 09:19

## 2023-03-12 RX ADMIN — MICONAZOLE NITRATE 2 % TOPICAL POWDER: at 17:19

## 2023-03-12 RX ADMIN — DILTIAZEM HYDROCHLORIDE 180 MG: 180 CAPSULE, COATED, EXTENDED RELEASE ORAL at 09:19

## 2023-03-12 RX ADMIN — CEFEPIME 2 G: 2 INJECTION, POWDER, FOR SOLUTION INTRAVENOUS at 14:30

## 2023-03-12 RX ADMIN — FUROSEMIDE 40 MG: 10 INJECTION, SOLUTION INTRAVENOUS at 17:18

## 2023-03-12 RX ADMIN — SODIUM CHLORIDE, PRESERVATIVE FREE 10 ML: 5 INJECTION INTRAVENOUS at 06:00

## 2023-03-12 RX ADMIN — MICONAZOLE NITRATE 2 % TOPICAL POWDER: at 09:27

## 2023-03-12 NOTE — ROUTINE PROCESS
Verbal shift change report given to Mendez (oncoming nurse) by Karol Bosworth (offgoing nurse). Report included the following information SBAR and Kardex.

## 2023-03-12 NOTE — PROGRESS NOTES
Hospitalist Progress Note  Thais Elkins MD  Answering service: [phone] from in house phone        Date of Service:  3/11/2023  NAME:  Evelia Sawyer  :  [birth year]  MRN:  [id number]      Admission Summary:   Evelia Sawyer is a 75 y.o. male past medical history of atrial fibrillation, long-term anticoagulation therapy on Eliquis, lower extremity edema, cellulitis, hypertension, type 2 diabetes mellitus, scrotal hernia, and obesity presented to the emergency department via EMS from home with chief complaints of unhealed wounds. Patient has multiple wounds of bilateral lower extremities with history of chronic venous stasis. Per reports patient's bilateral lower extremity wounds have been weeping with pus over the last 7 days with increased swelling edema both legs to severe, constant, without specific alleviating factors. There is no reports of fever. On arrival emergency department, initial reported vital signs temperature 98.4 °F, /105, heart rate 113, respiratory rate 20, O2 saturations 99% on room air. Left and right tib-fib fib x-ray showed bilateral lower extremity edema, minimal periosteal reaction along tibia and fibular diaphysis questionable for stress reaction or infection. 12 lead EKG showed atrial fibrillation with rapid ventricular response at 107 bpm.  ED administered diltiazem 20 mg IV x1 dose, Tylenol 650 mg p.o. x1, vancomycin 2500 mg IV x1 dose. Patient is now seen for admission to the hospitalist service. Patient complains of severe pain associated with leg swelling and edema. Despite the same, he notes he is ambulatory without assistance. In addition, he has scrotal open wounds which she notes are chronic and had hernia associated with scrotal swelling for several years. Interval history / Subjective:        Patient seen and examined this afternoon.   Patient expressed concern that edema to the legs still significant, weeping on the right. Increase Lasix to 40 mg IV twice daily, blood pressure is on the high side and creatinine is normal    Assessment & Plan:     Atrial fibrillation with RVR   -HR control achieved with IV diltiazem. Continue with p.o. diltiazem, metoprolol succinate. Anticoagulated with apixaban.  -Echo  EF 45-50%   -Cardiology consulted and following. Acute on chronic systolic/diastolic congestive heart failure NYHA class IV. EF 45 to 50%  -Placed on strict I's and O's and daily weights  -Significant lower extremity edema, increased Lasix to 40 mg IV twice daily, 3/11  -GDMT per cardiology. Chronic lower extremity venous stasis, chronic nonhealing ulcer complicated by cellulitis of right lower extremity   -Continue IV antibiotics, increase Lasix to 40 mg IV twice daily.  -Continue local wound care     Type 2 diabetes mellitus-A1c 3.5  -Order Humalog insulin correctional coverage, scheduled blood glucose checks      Essential hypertension  -Continue metoprolol, diltiazem     Class III obesity  -BMI 37.31 kg/M2  -Would encourage weight loss, reduced calorie diet, lifestyle modifications    Suprapubic catheter     Massive right inguinal hernia. Patient denies any complaints, states he has lived with it for so long it does not bother him.             Code status: full   Prophylaxis: eliquis   Care Plan discussed with: Case discussed with patient, IDR team.  Anticipated Disposition: Home with [facility] PT in 1-2 days     Hospital Problems  Never Reviewed            Codes Class Noted POA    Atrial fibrillation with RVR ([facility] [address] ICD-10-CM: I48.91  ICD-9-CM: 427.31  3/3/2023 Unknown        Edema of lower extremity ICD-10-CM: R60.0  ICD-9-CM: 782.3  3/3/2023 Unknown        Cellulitis of right lower extremity ICD-10-CM: L03.115  ICD-9-CM: 682.6  3/3/2023 Unknown        Cellulitis of left lower extremity ICD-10-CM: L03.116  ICD-9-CM: 682.6  3/3/2023 Unknown Review of Systems:   A comprehensive review of systems was negative except for that written in the HPI. Vital Signs:    Last 24hrs VS reviewed since prior progress note. Most recent are:  Visit Vitals  /70 (BP 1 Location: Left lower arm, BP Patient Position: Sitting)   Pulse 82   Temp 98.3 °F (36.8 °C)   Resp 18   Ht 5' 10\" (1.778 m)   Wt 136.2 kg (300 lb 4.3 oz)   SpO2 97%   BMI 43.08 kg/m²         Intake/Output Summary (Last 24 hours) at 3/11/2023 1901  Last data filed at 3/11/2023 1444  Gross per 24 hour   Intake 250 ml   Output 1550 ml   Net -1300 ml          Physical Examination:     I had a face to face encounter with this patient and independently examined them on 3/11/2023 as outlined below:          General : Patient seen sitting in a chair by the bedside, alert and oriented. HEENT: PEERL, EOMI, moist mucus membrane, TM clear  Neck: supple, no JVD, no meningeal signs  Chest: Clear to auscultation bilaterally   CVS: S1 S2 heard, Capillary refill less than 2 seconds  Abd: soft, large inguinal hernia, suprapuic catheter, mild red groin folded shon   Ext:Extensive, severe diffuse erythema, tenderness, swelling of both legs with large open stage II wound on the posterior aspect distal right leg  Stage II large open wound on scrotum  Edema improved     Neuro/Psych:no focal weakness. Skin: warm            Data Review:    Review and/or order of clinical lab test    I have independently reviewed and interpreted patient's lab and all other diagnostic data    Notes reviewed from all clinical/nonclinical/nursing services involved in patient's clinical care. Care coordination discussions were held with appropriate clinical/nonclinical/ nursing providers based on care coordination needs.      Labs:     Recent Labs     03/11/23  0715 03/10/23  0702   WBC 7.5 8.0   HGB 13.3 13.3   HCT 42.1 42.7    231       Recent Labs     03/11/23  0715 03/10/23  0702 03/09/23  0410    139 139   K 3.8 4.4 4.2  105 104   CO2 32 29 29   BUN 13 12 14   CREA 0.76 0.74 0.79   GLU 90 96 94   CA 8.8 8.5 8.2*       No results for input(s): ALT, AP, TBIL, TBILI, TP, ALB, GLOB, GGT, AML, LPSE in the last 72 hours. No lab exists for component: SGOT, GPT, AMYP, HLPSE    No results for input(s): INR, PTP, APTT, INREXT, INREXT in the last 72 hours. No results for input(s): FE, TIBC, PSAT, FERR in the last 72 hours. No results found for: FOL, RBCF   No results for input(s): PH, PCO2, PO2 in the last 72 hours. No results for input(s): CPK, CKNDX, TROIQ in the last 72 hours.     No lab exists for component: CPKMB  No results found for: CHOL, CHOLX, CHLST, CHOLV, HDL, HDLP, LDL, LDLC, DLDLP, TGLX, TRIGL, TRIGP, CHHD, CHHDX  Lab Results   Component Value Date/Time    Glucose (POC) 108 03/11/2023 04:33 PM    Glucose (POC) 106 03/11/2023 11:45 AM    Glucose (POC) 94 03/11/2023 06:32 AM    Glucose (POC) 112 03/10/2023 08:52 PM    Glucose (POC) 140 (H) 03/10/2023 04:16 PM     No results found for: COLOR, APPRN, SPGRU, REFSG, HUBER, PROTU, GLUCU, KETU, BILU, UROU, SHARON, LEUKU, GLUKE, EPSU, BACTU, WBCU, RBCU, CASTS, UCRY      Medications Reviewed:     Current Facility-Administered Medications   Medication Dose Route Frequency    furosemide (LASIX) injection 40 mg  40 mg IntraVENous BID    vancomycin (VANCOCIN) 1,250 mg in 0.9% sodium chloride 250 mL ([id number])  1,250 mg IntraVENous Q12H    traMADoL (ULTRAM) tablet 50 mg  50 mg Oral Q6H PRN    dilTIAZem ER (CARDIZEM CD) capsule 180 mg  180 mg Oral DAILY    apixaban (ELIQUIS) tablet 5 mg  5 mg Oral Q12H    ergocalciferol capsule 50,000 Units  50,000 Units Oral every Saturday    metoprolol succinate (TOPROL-XL) XL tablet 100 mg  100 mg Oral DAILY    cyanocobalamin (VITAMIN B12) tablet 1,000 mcg  1,000 mcg Oral DAILY    glucose chewable tablet 16 g  4 Tablet Oral PRN    glucagon (GLUCAGEN) injection 1 mg  1 mg IntraMUSCular PRN    dextrose 10 % infusion 0-250 mL  0-250 mL IntraVENous PRN    insulin lispro (HUMALOG) injection   SubCUTAneous AC&HS    sodium chloride (NS) flush 5-40 mL  5-40 mL IntraVENous Q8H    sodium chloride (NS) flush 5-40 mL  5-40 mL IntraVENous PRN    acetaminophen (TYLENOL) tablet 650 mg  650 mg Oral Q6H PRN    Or    acetaminophen (TYLENOL) suppository 650 mg  650 mg Rectal Q6H PRN    polyethylene glycol (MIRALAX) packet 17 g  17 g Oral DAILY PRN    ondansetron (ZOFRAN ODT) tablet 4 mg  4 mg Oral Q8H PRN    Or    ondansetron (ZOFRAN) injection 4 mg  4 mg IntraVENous Q6H PRN    morphine injection 4 mg  4 mg IntraVENous Q4H PRN    vancomycin - pharmacy to dose   Other Rx Dosing/Monitoring    cefepime (MAXIPIME) 2 g in 0.9% sodium chloride (MBP/ADV) 100 mL MBP  2 g IntraVENous Q12H    miconazole (MICOTIN) 2 % powder   Topical BID     ______________________________________________________________________  EXPECTED LENGTH OF STAY: 3d 9h  ACTUAL LENGTH OF STAY:          8                 Samuel Miles MD

## 2023-03-12 NOTE — PROGRESS NOTES
Problem: Falls - Risk of  Goal: *Absence of Falls  Description: Document Jyothi Damon Fall Risk and appropriate interventions in the flowsheet. Outcome: Progressing Towards Goal  Note: Fall Risk Interventions:            Medication Interventions: Bed/chair exit alarm    Elimination Interventions: Bed/chair exit alarm, Call light in reach              Problem: Pressure Injury - Risk of  Goal: *Prevention of pressure injury  Description: Document Wilder Scale and appropriate interventions in the flowsheet.   Outcome: Progressing Towards Goal  Note: Pressure Injury Interventions:  Sensory Interventions: Float heels    Moisture Interventions: Absorbent underpads    Activity Interventions: Increase time out of bed    Mobility Interventions: Float heels    Nutrition Interventions: Document food/fluid/supplement intake    Friction and Shear Interventions: Apply protective barrier, creams and emollients                Problem: Patient Education: Go to Patient Education Activity  Goal: Patient/Family Education  Outcome: Progressing Towards Goal

## 2023-03-12 NOTE — PROGRESS NOTES
Problem: Falls - Risk of  Goal: *Absence of Falls  Description: Document Curt Aparicio Fall Risk and appropriate interventions in the flowsheet.   Outcome: Progressing Towards Goal  Note: Fall Risk Interventions:            Medication Interventions: Bed/chair exit alarm    Elimination Interventions: Bed/chair exit alarm, Call light in reach

## 2023-03-12 NOTE — PROGRESS NOTES
Hospitalist Progress Note  Alysia Thomson MD  Answering service: [phone] from in house phone        Date of Service:  3/12/2023  NAME:  Niharika Etienne  :    MRN:  [id number]      Admission Summary:   Niharika Etienne is a 75 y.o. male past medical history of atrial fibrillation, long-term anticoagulation therapy on Eliquis, lower extremity edema, cellulitis, hypertension, type 2 diabetes mellitus, scrotal hernia, and obesity presented to the emergency department via EMS from home with chief complaints of unhealed wounds. Patient has multiple wounds of bilateral lower extremities with history of chronic venous stasis. Per reports patient's bilateral lower extremity wounds have been weeping with pus over the last 7 days with increased swelling edema both legs to severe, constant, without specific alleviating factors. There is no reports of fever. On arrival emergency department, initial reported vital signs temperature 98.4 °F, /105, heart rate 113, respiratory rate 20, O2 saturations 99% on room air. Left and right tib-fib fib x-ray showed bilateral lower extremity edema, minimal periosteal reaction along tibia and fibular diaphysis questionable for stress reaction or infection. 12 lead EKG showed atrial fibrillation with rapid ventricular response at 107 bpm.  ED administered diltiazem 20 mg IV x1 dose, Tylenol 650 mg p.o. x1, vancomycin 2500 mg IV x1 dose. Patient is now seen for admission to the hospitalist service. Patient complains of severe pain associated with leg swelling and edema. Despite the same, he notes he is ambulatory without assistance. In addition, he has scrotal open wounds which she notes are chronic and had hernia associated with scrotal swelling for several years.           Interval history / Subjective:        Resting quietly,diuresing well,leg edema down.    Assessment & Plan:     Atrial fibrillation with RVR   -HR control achieved with IV diltiazem. Continue with p.o. diltiazem, metoprolol succinate. Anticoagulated with apixaban.  -Echo  EF 45-50%   -Cardiology consulted and following. Acute on chronic systolic/diastolic congestive heart failure NYHA class IV. EF 45 to 50%  -Placed on strict I's and O's and daily weights  -Significant lower extremity edema, increased Lasix to 40 mg IV twice daily, 3/11  -GDMT per cardiology. Hypokalemia due to diuresis: Ordered potassium replacement. Chronic lower extremity venous stasis, chronic nonhealing ulcer complicated by cellulitis of right lower extremity   -Continue IV antibiotics, increase Lasix to 40 mg IV twice daily.  -Continue local wound care     Type 2 diabetes mellitus-A1c 3.5  -Order Humalog insulin correctional coverage, scheduled blood glucose checks      Essential hypertension  -Continue metoprolol, diltiazem     Class III obesity  -BMI 37.31 kg/M2  -Would encourage weight loss, reduced calorie diet, lifestyle modifications    Suprapubic catheter     Massive right inguinal hernia. Patient denies any complaints, states he has lived with it for so long it does not bother him. Code status: full   Prophylaxis: eliquis   Care Plan discussed with: Case discussed with patient, IDR team.  Anticipated Disposition: Home with [facility] [place] in 1-2 days     Hospital Problems  Never Reviewed            Codes Class Noted POA    Atrial fibrillation with RVR ([facility] [address] ICD-10-CM: I48.91  ICD-9-CM: 427.31  3/3/2023 Unknown        Edema of lower extremity ICD-10-CM: R60.0  ICD-9-CM: 782.3  3/3/2023 Unknown        Cellulitis of right lower extremity ICD-10-CM: L03.115  ICD-9-CM: 682.6  3/3/2023 Unknown        Cellulitis of left lower extremity ICD-10-CM: L03.116  ICD-9-CM: 682.6  3/3/2023 Unknown       Review of Systems:   A comprehensive review of systems was negative except for that written in the HPI. Vital Signs:    Last 24hrs VS reviewed since prior progress note. Most recent are:  Visit Vitals  /72 (BP 1 Location: Left upper arm, BP Patient Position: At rest)   Pulse (!) 101   Temp 98.2 °F (36.8 °C)   Resp 20   Ht 5' 10\" (1.778 m)   Wt 132 kg (291 lb 0.1 oz)   SpO2 97%   BMI 41.76 kg/m²         Intake/Output Summary (Last 24 hours) at 3/12/2023 1901  Last data filed at 3/12/2023 1447  Gross per 24 hour   Intake --   Output 2350 ml   Net -2350 ml          Physical Examination:     I had a face to face encounter with this patient and independently examined them on 3/12/2023 as outlined below:          General : Patient seen sitting in a chair by the bedside, alert and oriented. HEENT: PEERL, EOMI, moist mucus membrane, TM clear  Neck: supple, no JVD, no meningeal signs  Chest: Clear to auscultation bilaterally   CVS: S1 S2 heard, Capillary refill less than 2 seconds  Abd: soft, large inguinal hernia, suprapuic catheter, mild red groin folded shon   Ext: Legs bandaged, edema down. Chronic discoloration due to venous stasis. Stage II large open wound on scrotum  Edema improved     Neuro/Psych:no focal weakness. Skin: warm            Data Review:    Review and/or order of clinical lab test    I have independently reviewed and interpreted patient's lab and all other diagnostic data    Notes reviewed from all clinical/nonclinical/nursing services involved in patient's clinical care. Care coordination discussions were held with appropriate clinical/nonclinical/ nursing providers based on care coordination needs.      Labs:     Recent Labs     03/12/23  0736 03/11/23  0715   WBC 8.3 7.5   HGB 12.9 13.3   HCT 41.5 42.1    234       Recent Labs     03/12/23  0736 03/11/23  0715 03/10/23  0702    141 139   K 3.4* 3.8 4.4    102 105   CO2 32 32 29   BUN 14 13 12   CREA 0.74 0.76 0.74   GLU 90 90 96   CA 8.5 8.8 8.5       No results for input(s): ALT, AP, TBIL, TBILI, TP, ALB, GLOB, GGT, AML, LPSE in the last 72 hours. No lab exists for component: SGOT, GPT, AMYP, HLPSE    No results for input(s): INR, PTP, APTT, INREXT, INREXT in the last 72 hours. No results for input(s): FE, TIBC, PSAT, FERR in the last 72 hours. No results found for: FOL, RBCF   No results for input(s): PH, PCO2, PO2 in the last 72 hours. No results for input(s): CPK, CKNDX, TROIQ in the last 72 hours.     No lab exists for component: CPKMB  No results found for: CHOL, CHOLX, CHLST, CHOLV, HDL, HDLP, LDL, LDLC, DLDLP, TGLX, TRIGL, TRIGP, CHHD, CHHDX  Lab Results   Component Value Date/Time    Glucose (POC) 112 03/12/2023 05:09 PM    Glucose (POC) 107 03/12/2023 11:36 AM    Glucose (POC) 89 03/12/2023 07:18 AM    Glucose (POC) 108 03/11/2023 08:40 PM    Glucose (POC) 108 03/11/2023 04:33 PM     No results found for: COLOR, APPRN, SPGRU, REFSG, HUBER, PROTU, GLUCU, KETU, BILU, UROU, SHARON, LEUKU, GLUKE, EPSU, BACTU, WBCU, RBCU, CASTS, UCRY      Medications Reviewed:     Current Facility-Administered Medications   Medication Dose Route Frequency    potassium chloride SR (KLOR-CON 10) tablet 40 mEq  40 mEq Oral DAILY    furosemide (LASIX) injection 40 mg  40 mg IntraVENous BID    vancomycin (VANCOCIN) 1,250 mg in 0.9% sodium chloride 250 mL ([id number])  1,250 mg IntraVENous Q12H    traMADoL (ULTRAM) tablet 50 mg  50 mg Oral Q6H PRN    dilTIAZem ER (CARDIZEM CD) capsule 180 mg  180 mg Oral DAILY    apixaban (ELIQUIS) tablet 5 mg  5 mg Oral Q12H    ergocalciferol capsule 50,000 Units  50,000 Units Oral every Saturday    metoprolol succinate (TOPROL-XL) XL tablet 100 mg  100 mg Oral DAILY    cyanocobalamin (VITAMIN B12) tablet 1,000 mcg  1,000 mcg Oral DAILY    glucose chewable tablet 16 g  4 Tablet Oral PRN    glucagon (GLUCAGEN) injection 1 mg  1 mg IntraMUSCular PRN    dextrose 10 % infusion 0-250 mL  0-250 mL IntraVENous PRN    insulin lispro (HUMALOG) injection   SubCUTAneous AC&HS    sodium chloride (NS) flush 5-40 mL  5-40 mL IntraVENous Q8H    sodium chloride (NS) flush 5-40 mL  5-40 mL IntraVENous PRN    acetaminophen (TYLENOL) tablet 650 mg  650 mg Oral Q6H PRN    Or    acetaminophen (TYLENOL) suppository 650 mg  650 mg Rectal Q6H PRN    polyethylene glycol (MIRALAX) packet 17 g  17 g Oral DAILY PRN    ondansetron (ZOFRAN ODT) tablet 4 mg  4 mg Oral Q8H PRN    Or    ondansetron (ZOFRAN) injection 4 mg  4 mg IntraVENous Q6H PRN    morphine injection 4 mg  4 mg IntraVENous Q4H PRN    vancomycin - pharmacy to dose   Other Rx Dosing/Monitoring    cefepime (MAXIPIME) 2 g in 0.9% sodium chloride (MBP/ADV) 100 mL MBP  2 g IntraVENous Q12H    miconazole (MICOTIN) 2 % powder   Topical BID     ______________________________________________________________________  EXPECTED LENGTH OF STAY: 3d 9h  ACTUAL LENGTH OF STAY:          9                 Ashley Mujica MD

## 2023-03-13 LAB
ANION GAP SERPL CALC-SCNC: 7 MMOL/L (ref 5–15)
BUN SERPL-MCNC: 15 MG/DL (ref 6–20)
BUN/CREAT SERPL: 19 (ref 12–20)
CALCIUM SERPL-MCNC: 8.5 MG/DL (ref 8.5–10.1)
CHLORIDE SERPL-SCNC: 102 MMOL/L (ref 97–108)
CO2 SERPL-SCNC: 29 MMOL/L (ref 21–32)
CREAT SERPL-MCNC: 0.78 MG/DL (ref 0.7–1.3)
ERYTHROCYTE [DISTWIDTH] IN BLOOD BY AUTOMATED COUNT: 14.3 % (ref 11.5–14.5)
GLUCOSE BLD STRIP.AUTO-MCNC: 110 MG/DL (ref 65–117)
GLUCOSE BLD STRIP.AUTO-MCNC: 115 MG/DL (ref 65–117)
GLUCOSE BLD STRIP.AUTO-MCNC: 119 MG/DL (ref 65–117)
GLUCOSE BLD STRIP.AUTO-MCNC: 122 MG/DL (ref 65–117)
GLUCOSE BLD STRIP.AUTO-MCNC: 97 MG/DL (ref 65–117)
GLUCOSE SERPL-MCNC: 94 MG/DL (ref 65–100)
HCT VFR BLD AUTO: 41.5 % (ref 36.6–50.3)
HGB BLD-MCNC: 13.3 G/DL (ref 12.1–17)
MCH RBC QN AUTO: 31 PG (ref 26–34)
MCHC RBC AUTO-ENTMCNC: 32 G/DL (ref 30–36.5)
MCV RBC AUTO: 96.7 FL (ref 80–99)
NRBC # BLD: 0 K/UL (ref 0–0.01)
NRBC BLD-RTO: 0 PER 100 WBC
PLATELET # BLD AUTO: 242 K/UL (ref 150–400)
PMV BLD AUTO: 11.2 FL (ref 8.9–12.9)
POTASSIUM SERPL-SCNC: 3.6 MMOL/L (ref 3.5–5.1)
RBC # BLD AUTO: 4.29 M/UL (ref 4.1–5.7)
SERVICE CMNT-IMP: ABNORMAL
SERVICE CMNT-IMP: ABNORMAL
SERVICE CMNT-IMP: NORMAL
SODIUM SERPL-SCNC: 138 MMOL/L (ref 136–145)
WBC # BLD AUTO: 8.2 K/UL (ref 4.1–11.1)

## 2023-03-13 PROCEDURE — 74011250637 HC RX REV CODE- 250/637: Performed by: HOSPITALIST

## 2023-03-13 PROCEDURE — 36415 COLL VENOUS BLD VENIPUNCTURE: CPT

## 2023-03-13 PROCEDURE — 74011250636 HC RX REV CODE- 250/636: Performed by: HOSPITALIST

## 2023-03-13 PROCEDURE — 74011000258 HC RX REV CODE- 258: Performed by: HOSPITALIST

## 2023-03-13 PROCEDURE — 80048 BASIC METABOLIC PNL TOTAL CA: CPT

## 2023-03-13 PROCEDURE — 82962 GLUCOSE BLOOD TEST: CPT

## 2023-03-13 PROCEDURE — 65270000046 HC RM TELEMETRY

## 2023-03-13 PROCEDURE — 74011250637 HC RX REV CODE- 250/637: Performed by: FAMILY MEDICINE

## 2023-03-13 PROCEDURE — 97116 GAIT TRAINING THERAPY: CPT

## 2023-03-13 PROCEDURE — 85027 COMPLETE CBC AUTOMATED: CPT

## 2023-03-13 RX ORDER — FUROSEMIDE 40 MG/1
40 TABLET ORAL 2 TIMES DAILY
Status: DISCONTINUED | OUTPATIENT
Start: 2023-03-14 | End: 2023-03-14 | Stop reason: HOSPADM

## 2023-03-13 RX ORDER — FUROSEMIDE 10 MG/ML
40 INJECTION INTRAMUSCULAR; INTRAVENOUS 2 TIMES DAILY
Status: COMPLETED | OUTPATIENT
Start: 2023-03-13 | End: 2023-03-13

## 2023-03-13 RX ADMIN — CEFEPIME 2 G: 2 INJECTION, POWDER, FOR SOLUTION INTRAVENOUS at 16:43

## 2023-03-13 RX ADMIN — CYANOCOBALAMIN TAB 500 MCG 1000 MCG: 500 TAB at 09:23

## 2023-03-13 RX ADMIN — FUROSEMIDE 40 MG: 10 INJECTION, SOLUTION INTRAVENOUS at 09:23

## 2023-03-13 RX ADMIN — APIXABAN 5 MG: 5 TABLET, FILM COATED ORAL at 21:10

## 2023-03-13 RX ADMIN — APIXABAN 5 MG: 5 TABLET, FILM COATED ORAL at 09:23

## 2023-03-13 RX ADMIN — METOPROLOL SUCCINATE 100 MG: 50 TABLET, EXTENDED RELEASE ORAL at 09:23

## 2023-03-13 RX ADMIN — MICONAZOLE NITRATE 2 % TOPICAL POWDER: at 10:18

## 2023-03-13 RX ADMIN — DILTIAZEM HYDROCHLORIDE 180 MG: 180 CAPSULE, COATED, EXTENDED RELEASE ORAL at 09:23

## 2023-03-13 RX ADMIN — MICONAZOLE NITRATE 2 % TOPICAL POWDER: at 18:54

## 2023-03-13 RX ADMIN — FUROSEMIDE 40 MG: 10 INJECTION, SOLUTION INTRAVENOUS at 18:48

## 2023-03-13 RX ADMIN — POTASSIUM CHLORIDE 40 MEQ: 750 TABLET, FILM COATED, EXTENDED RELEASE ORAL at 09:23

## 2023-03-13 RX ADMIN — VANCOMYCIN HYDROCHLORIDE 1250 MG: 1.25 INJECTION, POWDER, LYOPHILIZED, FOR SOLUTION INTRAVENOUS at 21:09

## 2023-03-13 RX ADMIN — VANCOMYCIN HYDROCHLORIDE 1250 MG: 1.25 INJECTION, POWDER, LYOPHILIZED, FOR SOLUTION INTRAVENOUS at 10:10

## 2023-03-13 RX ADMIN — CEFEPIME 2 G: 2 INJECTION, POWDER, FOR SOLUTION INTRAVENOUS at 03:59

## 2023-03-13 NOTE — PROGRESS NOTES
Problem: Mobility Impaired (Adult and Pediatric)  Goal: *Acute Goals and Plan of Care (Insert Text)  Description: FUNCTIONAL STATUS PRIOR TO ADMISSION: Patient was independent and active without use of DME. He reported that he sleeps in a chair and was unable to tell me when the last time was that he slept in a bed. He denied any falls in the last 12 months when asked    [address]: The patient lived with roommates but did not require assist.. Physical Therapy Goals  Updated 3/13/2023  1. Patient will ambulate with independence for 300 feet with the least restrictive device within 7 day(s). 2.  Patient will ascend/descend 13 stairs with one handrail(s) with modified independence within 7 day(s). Physical Therapy Goals  Initiated 3/6/2023  1. Patient will move from supine to sit and sit to supine , scoot up and down, and roll side to side in bed with independence within 7 day(s). 2.  Patient will transfer from bed to chair and chair to bed with independence using the least restrictive device within 7 day(s). 3.  Patient will perform sit to stand with independence within 7 day(s). 4.  Patient will ambulate with independence for 150 feet with the least restrictive device within 7 day(s). 5.  Patient will ascend/descend 13 stairs with one handrail(s) with modified independence within 7 day(s). Outcome: Progressing Towards Goal   PHYSICAL THERAPY TREATMENT: WEEKLY REASSESSMENT  Patient: Mta Hull (61 y.o. male)  Date: 3/13/2023  Primary Diagnosis: Atrial fibrillation with RVR (HCC) [I48.91]  Edema of lower extremity [R60.0]  Cellulitis of left lower extremity [L03.116]  Cellulitis of right lower extremity [L03.115]       Precautions:  Fall, Skin (bariatric equipment needs)      ASSESSMENT  Pt seen following RN clearance. Patient continues with skilled PT services and is progressing towards goals. Pt has done activity required for return home with HHPT.  Pt reports his roommate can assist with getting groceries up 13 steps to enter apartment. Pt was able to completed steps, gait training, and bed mobility without significant external support. Anticipate he will be able to continue to progress well, but will need to remain compliant with TID walks/OOB to chair for meals. Will decreased frequency and following to insure continued progress/prevent decline before discharge; pt safe to walk in hallways with remote supervision of staff. Recommend: OOB to chair for all meals/toileting and TID walks in hallway    Next session: progress stair training and gait tolerance/independence    Patient's progression toward goals since last assessment: good    Current Level of Function Impacting Discharge (mobility/balance): upwards CGA on stairs    Other factors to consider for discharge: pt has 13 steps to access his home- able to complete for first time today (pt worried about bringing items up steps-food, etc...)         PLAN :  Goals have been updated based on progression since last assessment. Patient continues to benefit from skilled intervention to address the above impairments. Recommendations and Planned Interventions: gait training, therapeutic exercises, edema management/control, patient and family training/education, and therapeutic activities      Frequency/Duration: Patient will be followed by physical therapy:  3 times a week to address goals. Recommendation for discharge: (in order for the patient to meet his/her long term goals)  Physical therapy at least 2 days/week in the home  vs none pending progress    This discharge recommendation:  Has been made in collaboration with the attending provider and/or case management    IF patient discharges home will need the following DME: none         SUBJECTIVE:   Patient stated Not really. ...  Pt joking with therapist regarding attempting the stairs prior to walking (for energy conservation). ..     OBJECTIVE DATA SUMMARY: Personal factors and/or comorbidities impacting plan of care: pt with decreased activity tolerance, increased body habitus/impaired AROM, generalized weakness, improving balance    Home Situation  Home Environment: Apartment  # Steps to Enter: 0  One/Two Story Residence: Two story  # of Interior Steps: 13  Interior Rails: Both  Living Alone: No  Support Systems:  (roommates)  Patient Expects to be Discharged to[de-identified] Home with home health  Current DME Used/Available at Home: Walker, rolling, Wheelchair, [address] chair, Grab bars  Tub or Shower Type: Tub/Shower combination    EXAMINATION/PRESENTATION/DECISION MAKING:   Critical Behavior:  Neurologic State: Alert  Orientation Level: Oriented X4  Cognition: Follows commands  Safety/Judgement: Awareness of environment, Fall prevention, Insight into deficits  Hearing: Auditory  Auditory Impairment: None  Functional Mobility:  Bed Mobility:  Supine to Sit: Modified independent;Bed Modified  Sit to Supine:  (NT; seated EOB for meal)  Scooting: Independent  Transfers:  Sit to Stand: Independent  Stand to Sit: Independent  Balance:   Sitting: Intact  Standing: Intact  Standing - Static: Good  Standing - Dynamic : Fair (HHA on rails for stairs)  Ambulation/Gait Training:  Distance (ft): 175 Feet (ft)  Assistive Device: Gait belt  Ambulation - Level of Assistance: Supervision  Gait Abnormalities: Decreased step clearance;Trunk sway increased (gait quality improved from previous session)  Base of Support: Center of gravity altered; Widened  Stance: Right decreased  Speed/Connie: Slow;Pace decreased (<100 feet/min)  Step Length: Right shortened;Left shortened   Stairs:  Number of Stairs Trained: 13  Stairs - Level of Assistance: Contact guard assistance   Rail Use: Both    Therapeutic Exercises:   Encouraged APs, LAQ, marching for LE strengthening and edema management      Pain Rating:  NA    Activity Tolerance:   Fair and requires rest breaks    After treatment patient left in no apparent distress:   Sitting on EOB with call bell and NAD    COMMUNICATION/EDUCATION:   The patients plan of care was discussed with: Registered nurse. Fall prevention education was provided and the patient/caregiver indicated understanding., Patient/family have participated as able in goal setting and plan of care. , and Patient/family agree to work toward stated goals and plan of care.     Thank you for this referral.  Inés Cool   Time Calculation: 12 mins

## 2023-03-13 NOTE — PROGRESS NOTES
Hospitalist Progress Note  Paola Frias MD  Answering service: [phone] from in house phone        Date of Service:  3/13/2023  NAME:  Rosi Godinez  :    MRN:  [id number]      Admission Summary:   Rosi Godinez is a 75 y.o. male past medical history of atrial fibrillation, long-term anticoagulation therapy on Eliquis, lower extremity edema, cellulitis, hypertension, type 2 diabetes mellitus, scrotal hernia, and obesity presented to the emergency department via EMS from home with chief complaints of unhealed wounds. Patient has multiple wounds of bilateral lower extremities with history of chronic venous stasis. Per reports patient's bilateral lower extremity wounds have been weeping with pus over the last 7 days with increased swelling edema both legs to severe, constant, without specific alleviating factors. There is no reports of fever. On arrival emergency department, initial reported vital signs temperature 98.4 °F, /105, heart rate 113, respiratory rate 20, O2 saturations 99% on room air. Left and right tib-fib fib x-ray showed bilateral lower extremity edema, minimal periosteal reaction along tibia and fibular diaphysis questionable for stress reaction or infection. 12 lead EKG showed atrial fibrillation with rapid ventricular response at 107 bpm.  ED administered diltiazem 20 mg IV x1 dose, Tylenol 650 mg p.o. x1, vancomycin 2500 mg IV x1 dose. Patient is now seen for admission to the hospitalist service. Patient complains of severe pain associated with leg swelling and edema. Despite the same, he notes he is ambulatory without assistance. In addition, he has scrotal open wounds which she notes are chronic and had hernia associated with scrotal swelling for several years. Interval history / Subjective:        Patient was resting quietly during my rounds today. He remained on room air. He denies shortness of breath/difficulty breathing. Edema has continued to improve. We discussed transitioning to oral diuretics and possible discharge tomorrow. Assessment & Plan:     Atrial fibrillation with RVR   -HR control achieved with IV diltiazem. Continue with p.o. diltiazem, metoprolol succinate. Anticoagulated with apixaban.  -Echo  EF 45-50%   -Cardiology consulted and following. Acute on chronic systolic/diastolic congestive heart failure NYHA class IV NYHA class II normal.  EF 45 to 50%  -Placed on strict I's and O's and daily weights  -Edema has significantly improved, switch to oral Lasix from tomorrow. -GDMT per cardiology: On apixaban, diltiazem, Lasix, Toprol. Follow-up with primary cardiologist Dr. Ennis Record after discharge. Hypokalemia due to diuresis: Continue potassium replacement. Chronic lower extremity venous stasis, chronic nonhealing ulcer complicated by cellulitis of right lower extremity   - Will complete 10 days of IV cefepime and vancomycin on 3/14. Blood culture negative. No wound culture. X-ray of both legs showed edema and minimal periosteal reaction without evidence of bony erosion. .  -Continue local wound care  -Edema significantly improved with diuresis. Type 2 diabetes mellitus-A1c 3.5  -Order Humalog insulin correctional coverage, scheduled blood glucose checks      Essential hypertension  -Continue metoprolol, diltiazem     Class III obesity  -BMI 37.31 kg/M2  -Would encourage weight loss, reduced calorie diet, lifestyle modifications    Suprapubic catheter     Massive right inguinal hernia. Patient denies any complaints, states he has lived with it for so long it does not bother him. Code status: full   Prophylaxis: eliquis   Care Plan discussed with: Case discussed with patient, IDR team.  Anticipated Disposition: Anticipate discharge home with home health services tomorrow.      Hospital Problems  Never Reviewed Codes Class Noted POA    Atrial fibrillation with RVR (HCC) ICD-10-CM: I48.91  ICD-9-CM: 427.31  3/3/2023 Unknown        Edema of lower extremity ICD-10-CM: R60.0  ICD-9-CM: 782.3  3/3/2023 Unknown        Cellulitis of right lower extremity ICD-10-CM: L03.115  ICD-9-CM: 682.6  3/3/2023 Unknown        Cellulitis of left lower extremity ICD-10-CM: L03.116  ICD-9-CM: 682.6  3/3/2023 Unknown       Review of Systems:   A comprehensive review of systems was negative except for that written in the HPI. Vital Signs:    Last 24hrs VS reviewed since prior progress note. Most recent are:  Visit Vitals  /72 (BP 1 Location: Left upper arm, BP Patient Position: At rest)   Pulse 79   Temp 98.1 °F (36.7 °C)   Resp 16   Ht 5' 10\" (1.778 m)   Wt 132 kg (291 lb 0.1 oz)   SpO2 97%   BMI 41.76 kg/m²         Intake/Output Summary (Last 24 hours) at 3/13/2023 1739  Last data filed at 3/13/2023 1723  Gross per 24 hour   Intake --   Output 2450 ml   Net -2450 ml          Physical Examination:     I had a face to face encounter with this patient and independently examined them on 3/13/2023 as outlined below:          General : Patient seen sitting in a chair by the bedside, alert and oriented. HEENT: PEERL, EOMI, moist mucus membrane, TM clear  Neck: supple, no JVD, no meningeal signs  Chest: Clear to auscultation bilaterally   CVS: S1 S2 heard, Capillary refill less than 2 seconds  Abd: soft, large inguinal hernia, suprapuic catheter, mild red groin folded shon   Ext: Legs bandaged, edema significantly down. Chronic discoloration due to venous stasis. Stage II large open wound on scrotum  Edema improved     Neuro/Psych:no focal weakness. Skin: warm            Data Review:    Review and/or order of clinical lab test    I have independently reviewed and interpreted patient's lab and all other diagnostic data    Notes reviewed from all clinical/nonclinical/nursing services involved in patient's clinical care.  Care coordination discussions were held with appropriate clinical/nonclinical/ nursing providers based on care coordination needs. Labs:     Recent Labs     03/13/23  0416 03/12/23  0736   WBC 8.2 8.3   HGB 13.3 12.9   HCT 41.5 41.5    233       Recent Labs     03/13/23  0416 03/12/23  0736 03/11/23  0715    141 141   K 3.6 3.4* 3.8    102 102   CO2 29 32 32   BUN 15 14 13   CREA 0.78 0.74 0.76   GLU 94 90 90   CA 8.5 8.5 8.8       No results for input(s): ALT, AP, TBIL, TBILI, TP, ALB, GLOB, GGT, AML, LPSE in the last 72 hours. No lab exists for component: SGOT, GPT, AMYP, HLPSE    No results for input(s): INR, PTP, APTT, INREXT, INREXT in the last 72 hours. No results for input(s): FE, TIBC, PSAT, FERR in the last 72 hours. No results found for: FOL, RBCF   No results for input(s): PH, PCO2, PO2 in the last 72 hours. No results for input(s): CPK, CKNDX, TROIQ in the last 72 hours.     No lab exists for component: CPKMB  No results found for: CHOL, CHOLX, CHLST, CHOLV, HDL, HDLP, LDL, LDLC, DLDLP, TGLX, TRIGL, TRIGP, CHHD, CHHDX  Lab Results   Component Value Date/Time    Glucose (POC) 110 03/13/2023 04:49 PM    Glucose (POC) 119 (H) 03/13/2023 12:14 PM    Glucose (POC) 122 (H) 03/13/2023 11:26 AM    Glucose (POC) 97 03/13/2023 06:48 AM    Glucose (POC) 160 (H) 03/12/2023 10:01 PM     No results found for: COLOR, APPRN, SPGRU, REFSG, HUBER, PROTU, GLUCU, KETU, BILU, UROU, SHARON, LEUKU, GLUKE, EPSU, BACTU, WBCU, RBCU, CASTS, UCRY      Medications Reviewed:     Current Facility-Administered Medications   Medication Dose Route Frequency    [START ON 3/14/2023] Vancomycin Random level- Please draw 3/14 with am labs   Other ONCE    furosemide (LASIX) injection 40 mg  40 mg IntraVENous BID    [START ON 3/14/2023] furosemide (LASIX) tablet 40 mg  40 mg Oral BID    potassium chloride SR (KLOR-CON 10) tablet 40 mEq  40 mEq Oral DAILY    vancomycin (VANCOCIN) 1,250 mg in 0.9% sodium chloride 250 mL ([id number])  1,250 mg IntraVENous Q12H    traMADoL (ULTRAM) tablet 50 mg  50 mg Oral Q6H PRN    dilTIAZem ER (CARDIZEM CD) capsule 180 mg  180 mg Oral DAILY    apixaban (ELIQUIS) tablet 5 mg  5 mg Oral Q12H    ergocalciferol capsule 50,000 Units  50,000 Units Oral every Saturday    metoprolol succinate (TOPROL-XL) XL tablet 100 mg  100 mg Oral DAILY    cyanocobalamin (VITAMIN B12) tablet 1,000 mcg  1,000 mcg Oral DAILY    glucose chewable tablet 16 g  4 Tablet Oral PRN    glucagon (GLUCAGEN) injection 1 mg  1 mg IntraMUSCular PRN    dextrose 10 % infusion 0-250 mL  0-250 mL IntraVENous PRN    insulin lispro (HUMALOG) injection   SubCUTAneous AC&HS    sodium chloride (NS) flush 5-40 mL  5-40 mL IntraVENous Q8H    sodium chloride (NS) flush 5-40 mL  5-40 mL IntraVENous PRN    acetaminophen (TYLENOL) tablet 650 mg  650 mg Oral Q6H PRN    Or    acetaminophen (TYLENOL) suppository 650 mg  650 mg Rectal Q6H PRN    polyethylene glycol (MIRALAX) packet 17 g  17 g Oral DAILY PRN    ondansetron (ZOFRAN ODT) tablet 4 mg  4 mg Oral Q8H PRN    Or    ondansetron (ZOFRAN) injection 4 mg  4 mg IntraVENous Q6H PRN    morphine injection 4 mg  4 mg IntraVENous Q4H PRN    vancomycin - pharmacy to dose   Other Rx Dosing/Monitoring    cefepime (MAXIPIME) 2 g in 0.9% sodium chloride (MBP/ADV) 100 mL MBP  2 g IntraVENous Q12H    miconazole (MICOTIN) 2 % powder   Topical BID     ______________________________________________________________________  EXPECTED LENGTH OF STAY: 3d 9h  ACTUAL LENGTH OF STAY:          10                 Pancho Shah MD

## 2023-03-13 NOTE — PROGRESS NOTES
Transition Of Care: The patient plans to discharge home with [facility]. Transport likely by [employer] (round trip). The patient is ambulatory. 2nd I/M letter to be delivered prior to discharge. RUR: 9%    Emergency contact is brother in [facility] [place]: Jessy Lax: [phone]. CM following for discharge needs.     Eldridge Sandhoff RN/CRM

## 2023-03-14 VITALS
RESPIRATION RATE: 18 BRPM | BODY MASS INDEX: 41.98 KG/M2 | HEART RATE: 89 BPM | OXYGEN SATURATION: 95 % | DIASTOLIC BLOOD PRESSURE: 84 MMHG | TEMPERATURE: 98.1 F | WEIGHT: 293.21 LBS | HEIGHT: 70 IN | SYSTOLIC BLOOD PRESSURE: 136 MMHG

## 2023-03-14 LAB
ANION GAP SERPL CALC-SCNC: 7 MMOL/L (ref 5–15)
BUN SERPL-MCNC: 16 MG/DL (ref 6–20)
BUN/CREAT SERPL: 21 (ref 12–20)
CALCIUM SERPL-MCNC: 8.8 MG/DL (ref 8.5–10.1)
CHLORIDE SERPL-SCNC: 103 MMOL/L (ref 97–108)
CO2 SERPL-SCNC: 31 MMOL/L (ref 21–32)
CREAT SERPL-MCNC: 0.78 MG/DL (ref 0.7–1.3)
GLUCOSE BLD STRIP.AUTO-MCNC: 117 MG/DL (ref 65–117)
GLUCOSE BLD STRIP.AUTO-MCNC: 120 MG/DL (ref 65–117)
GLUCOSE BLD STRIP.AUTO-MCNC: 99 MG/DL (ref 65–117)
GLUCOSE SERPL-MCNC: 96 MG/DL (ref 65–100)
POTASSIUM SERPL-SCNC: 3.6 MMOL/L (ref 3.5–5.1)
SERVICE CMNT-IMP: ABNORMAL
SERVICE CMNT-IMP: NORMAL
SERVICE CMNT-IMP: NORMAL
SODIUM SERPL-SCNC: 141 MMOL/L (ref 136–145)
VANCOMYCIN SERPL-MCNC: 21.7 UG/ML

## 2023-03-14 PROCEDURE — 74011250636 HC RX REV CODE- 250/636: Performed by: HOSPITALIST

## 2023-03-14 PROCEDURE — 74011250637 HC RX REV CODE- 250/637: Performed by: HOSPITALIST

## 2023-03-14 PROCEDURE — 82962 GLUCOSE BLOOD TEST: CPT

## 2023-03-14 PROCEDURE — 80048 BASIC METABOLIC PNL TOTAL CA: CPT

## 2023-03-14 PROCEDURE — 74011250637 HC RX REV CODE- 250/637: Performed by: FAMILY MEDICINE

## 2023-03-14 PROCEDURE — 80202 ASSAY OF VANCOMYCIN: CPT

## 2023-03-14 PROCEDURE — 74011000258 HC RX REV CODE- 258: Performed by: HOSPITALIST

## 2023-03-14 PROCEDURE — 36415 COLL VENOUS BLD VENIPUNCTURE: CPT

## 2023-03-14 RX ORDER — FUROSEMIDE 40 MG/1
40 TABLET ORAL DAILY
Qty: 30 TABLET | Refills: 0 | Status: SHIPPED | OUTPATIENT
Start: 2023-03-14 | End: 2023-04-13

## 2023-03-14 RX ORDER — AMOXICILLIN AND CLAVULANATE POTASSIUM 875; 125 MG/1; MG/1
1 TABLET, FILM COATED ORAL EVERY 12 HOURS
Qty: 10 TABLET | Refills: 0 | Status: SHIPPED | OUTPATIENT
Start: 2023-03-14 | End: 2023-03-19

## 2023-03-14 RX ADMIN — POTASSIUM CHLORIDE 40 MEQ: 750 TABLET, FILM COATED, EXTENDED RELEASE ORAL at 08:53

## 2023-03-14 RX ADMIN — ACETAMINOPHEN 650 MG: 325 TABLET ORAL at 12:16

## 2023-03-14 RX ADMIN — CEFEPIME 2 G: 2 INJECTION, POWDER, FOR SOLUTION INTRAVENOUS at 02:10

## 2023-03-14 RX ADMIN — APIXABAN 5 MG: 5 TABLET, FILM COATED ORAL at 08:53

## 2023-03-14 RX ADMIN — DILTIAZEM HYDROCHLORIDE 180 MG: 180 CAPSULE, COATED, EXTENDED RELEASE ORAL at 08:53

## 2023-03-14 RX ADMIN — FUROSEMIDE 40 MG: 40 TABLET ORAL at 08:53

## 2023-03-14 RX ADMIN — METOPROLOL SUCCINATE 100 MG: 50 TABLET, EXTENDED RELEASE ORAL at 08:53

## 2023-03-14 RX ADMIN — MICONAZOLE NITRATE 2 % TOPICAL POWDER: at 08:56

## 2023-03-14 RX ADMIN — VANCOMYCIN HYDROCHLORIDE 1000 MG: 1 INJECTION, POWDER, LYOPHILIZED, FOR SOLUTION INTRAVENOUS at 10:32

## 2023-03-14 RX ADMIN — CYANOCOBALAMIN TAB 500 MCG 1000 MCG: 500 TAB at 08:53

## 2023-03-14 NOTE — DISCHARGE INSTRUCTIONS
Discharge Instructions       PATIENT ID: Yissel Reed  MRN: [id number]   YOB: 1955    DATE OF ADMISSION: 3/3/2023   DATE OF DISCHARGE: 3/14/2023    PRIMARY CARE PROVIDER: Jorge L Llanes     ATTENDING PHYSICIAN: Iraida Walter MD   DISCHARGING PROVIDER: Josselyn Mathias MD    To contact this individual call [phone] and ask the  to page. If unavailable ask to be transferred the Adult Hospitalist Department. DISCHARGE DIAGNOSES     Atrial fibrillation patient with rapid ventricular response. Controlled. Continue to take prescribed medications. Follow-up with his cardiologist.  Acute on chronic diastolic and systolic heart failure. Bilateral lower extremity edema, chronic lower extremity venous stasis, chronic nonhealing ulcer complicated by cellulitis of right lower extremity     -Her heart rate is controlled, intake medications as prescribed  -Leg edema is significantly improved with diuretics. Take the prescribed oral diuretics. -Recommend you follow-up with your primary doctor within a week, get blood work to check for electrolytes and kidney function  -We will also recommend for you to follow-up with your cardiologist for follow-up of congestive heart failure and atrial fibrillation         CONSULTATIONS: Cardiology, wound care specialist.    PROCEDURES/SURGERIES: * No surgery found *    PENDING TEST RESULTS:   At the time of discharge the following test results are still pending: None    FOLLOW UP APPOINTMENTS:   -PCP  -Cardiologist    ADDITIONAL CARE RECOMMENDATIONS:     DIET: Regular Diet    ACTIVITY: Activity as tolerated and PT/OT per Home Health    WOUND CARE:   Minimize layers of linen/pads under patient to optimize support surface. Manage moisture/ Keep skin folds clean and dry/minimize brief usage. Right and left low leg wounds:  Daily cleanse with saline; apply Xeroform; cover with dry gauze, abd pad and roll gauze.   You should follow up with  Fransisco Joe, podiatrist upon discharge. EQUIPMENT needed:None        DISCHARGE MEDICATIONS:        My Medications        START taking these medications        Instructions Each Dose to Equal Morning Noon Evening Bedtime   amoxicillin-clavulanate 875-125 mg per tablet  Commonly known as: Augmentin    Your last dose was: Your next dose is: Take 1 Tablet by mouth every twelve (12) hours for 5 days. 1 Tablet                 furosemide 40 mg tablet  Commonly known as: LASIX    Your last dose was: Your next dose is: Take 1 Tablet by mouth daily for 30 days. 40 mg                 L.acidoph & parac-S.therm-Bifido 16 billion cell Cap cap  Commonly known as: NICANOR Q2/RISAQUAD-2    Your last dose was: Your next dose is: Take 1 Capsule by mouth daily for 30 days. 1 Capsule                        CONTINUE taking these medications        Instructions Each Dose to Equal Morning Noon Evening Bedtime   dilTIAZem  mg capsule  Commonly known as: TIAZAC    Your last dose was: Your next dose is: Take 180 mg by mouth daily. Has not taken since February 20th   180 mg                 Eliquis 5 mg tablet  Generic drug: apixaban    Your last dose was: Your next dose is: Take 5 mg by mouth two (2) times a day. 5 mg                 ergocalciferol 1,250 mcg (50,000 unit) capsule  Commonly known as: ERGOCALCIFEROL    Your last dose was: Your next dose is:         TAKE 1 CAPSULE BY MOUTH ONE TIME PER WEEK                  metFORMIN  mg tablet  Commonly known as: GLUCOPHAGE XR    Your last dose was: Your next dose is: Take 1,000 mg by mouth two (2) times a day. 1,000 mg                 metoprolol succinate 100 mg tablet  Commonly known as: TOPROL-XL    Your last dose was: Your next dose is: Take 100 mg by mouth daily. 100 mg                 Vitamin B-12 1,000 mcg tablet  Generic drug: cyanocobalamin    Your last dose was:      Your next dose is: Take 1,000 mcg by mouth daily. 1,000 mcg                           Where to Get Your Medications        These medications were sent to [address], [facility]      Phone: [phone]   amoxicillin-clavulanate 875-125 mg per tablet  furosemide 40 mg tablet  L.acidoph & parac-S.therm-Bifido 16 billion cell Cap cap           It is important that you take the medication exactly as they are prescribed. Keep your medication in the bottles provided by the pharmacist and keep a list of the medication names, dosages, and times to be taken in your wallet. Do not take other medications without consulting your doctor. NOTIFY YOUR PHYSICIAN FOR ANY OF THE FOLLOWING:   Fever over 101 degrees for 24 hours. Chest pain, shortness of breath, fever, chills, nausea, vomiting, diarrhea, change in mentation, falling, weakness, bleeding. Severe pain or pain not relieved by medications. Or, any other signs or symptoms that you may have questions about. DISPOSITION:  x  Home With:  x OT x PT  HH  RN       SNF/Inpatient Rehab/LTAC    Independent/assisted living    Hospice    Other:           Signed:    Samuel Miles MD  3/14/2023  12:39 PM

## 2023-03-14 NOTE — DISCHARGE SUMMARY
Discharge Summary       PATIENT ID: Kimberly Espinosa  MRN: [id number]   YOB: 1955    DATE OF ADMISSION: 3/3/2023  9:41 PM    DATE OF DISCHARGE: 03/14/23     PRIMARY CARE PROVIDER: Roberto Estrada MD     ATTENDING PHYSICIAN: Mazin Arguello MD    DISCHARGING PROVIDER: Mazin Arguello MD    To contact this individual call [phone] and ask the  to page. If unavailable ask to be transferred the Adult Hospitalist Department. CONSULTATIONS: IP CONSULT TO HOSPITALIST  IP CONSULT TO CARDIOLOGY    PROCEDURES/SURGERIES: * No surgery found *    ADMITTING [address] [place] [address] COURSE:     Admission Summary:   Kimberly Espinosa is a 75 y.o. male past medical history of atrial fibrillation, long-term anticoagulation therapy on Eliquis, lower extremity edema, cellulitis, hypertension, type 2 diabetes mellitus, scrotal hernia, and obesity presented to the emergency department via EMS from home with chief complaints of unhealed wounds. Patient has multiple wounds of bilateral lower extremities with history of chronic venous stasis. Per reports patient's bilateral lower extremity wounds have been weeping with pus over the last 7 days with increased swelling edema both legs to severe, constant, without specific alleviating factors. There is no reports of fever. On arrival emergency department, initial reported vital signs temperature 98.4 °F, /105, heart rate 113, respiratory rate 20, O2 saturations 99% on room air. Left and right tib-fib fib x-ray showed bilateral lower extremity edema, minimal periosteal reaction along tibia and fibular diaphysis questionable for stress reaction or infection. 12 lead EKG showed atrial fibrillation with rapid ventricular response at 107 bpm.  ED administered diltiazem 20 mg IV x1 dose, Tylenol 650 mg p.o. x1, vancomycin 2500 mg IV x1 dose. Patient is now seen for admission to the hospitalist service.   Patient complains of severe pain associated with leg swelling and edema. Despite the same, he notes he is ambulatory without assistance. In addition, he has scrotal open wounds which she notes are chronic and had hernia associated with scrotal swelling for several years. Atrial fibrillation with RVR, required IV diltiazem to control HR now switched to oral diltiazem and metoprolol succinate. Anticoagulated with apixaban. Cardiology were on board, guidance appreciated. Acute on chronic systolic/diastolic congestive heart failure NYHA class IV NYHA class II normal.  EF 45 to 50%  -GDMT per cardiology: On apixaban, diltiazem, Lasix, Toprol. Follow-up with primary cardiologist Dr. Gil Lutz after discharge. Hypokalemia due to diuresis: Replaced. Chronic lower extremity venous stasis, chronic nonhealing ulcer complicated by cellulitis of right lower extremity   - Treated with IV cefepime and vancomycin on 3/14 and discharged on Augmentin to complete course of antibiotics. Blood culture negative. No wound culture. X-ray of both legs showed edema and minimal periosteal reaction without evidence of bony erosion. .  -Continue local wound care  -Edema significantly improved with diuresis. -Patient discharged with furosemide     Type 2 diabetes mellitus-A1c 3.5  -Order Humalog insulin correctional coverage, scheduled blood glucose checks      Essential hypertension  -Continue metoprolol, diltiazem     Class III obesity  -BMI 37.31 kg/M2  -Would encourage weight loss, reduced calorie diet, lifestyle modifications          Massive right inguinal hernia. Patient denies any complaints, states he has lived with it for so long it does not bother him. Patient was ambulatory on the nursing station. PT and OT recommended home health services, arranged. We recommended outpatient follow-up with PCP within a week of discharge.         PENDING TEST RESULTS:   At the time of discharge the following test results are still pending: None    FOLLOW UP APPOINTMENTS:    Follow-up Information       Follow up With Specialties Details Why [facility]  Call for home health, physical therapy, occupational therapy, skilled nursing [phone]    Rickey Eason MD Internal Medicine Physician Follow up in 1 week(s)  [address] [facility]  [phone]      Osvaldo Wiggins DPM Podiatry Follow up  [address] [facility]  [phone]               ADDITIONAL CARE RECOMMENDATIONS:     DIET: Regular Diet and Cardiac Diet    ACTIVITY: Activity as tolerated and PT/OT per [address]: Home health for PT, OT, nursing arranged    EQUIPMENT needed: Own      DISCHARGE MEDICATIONS:  Current Discharge Medication List        START taking these medications    Details   furosemide (LASIX) 40 mg tablet Take 1 Tablet by mouth daily for 30 days. Qty: 30 Tablet, Refills: 0  Start date: 3/14/2023, End date: 4/13/2023      amoxicillin-clavulanate (Augmentin) 875-125 mg per tablet Take 1 Tablet by mouth every twelve (12) hours for 5 days. Qty: 10 Tablet, Refills: 0  Start date: 3/14/2023, End date: 3/19/2023      L.acidoph & parac-S.therm-Bifido (NICANOR Q2/RISAQUAD-2) 16 billion cell cap cap Take 1 Capsule by mouth daily for 30 days. Qty: 30 Capsule, Refills: 0  Start date: 3/14/2023, End date: 4/13/2023           CONTINUE these medications which have NOT CHANGED    Details   Eliquis 5 mg tablet Take 5 mg by mouth two (2) times a day. Vitamin B-12 1,000 mcg tablet Take 1,000 mcg by mouth daily. ergocalciferol (ERGOCALCIFEROL) 1,250 mcg (50,000 unit) capsule TAKE 1 CAPSULE BY MOUTH ONE TIME PER WEEK      metoprolol succinate (TOPROL-XL) 100 mg tablet Take 100 mg by mouth daily. metFORMIN ER (GLUCOPHAGE XR) 500 mg tablet Take 1,000 mg by mouth two (2) times a day. dilTIAZem ER (TIAZAC) 180 mg capsule Take 180 mg by mouth daily.  Has not taken since February 20th               NOTIFY YOUR PHYSICIAN FOR ANY OF THE FOLLOWING:   Fever over 101 degrees for 24 hours. Chest pain, shortness of breath, fever, chills, nausea, vomiting, diarrhea, change in mentation, falling, weakness, bleeding. Severe pain or pain not relieved by medications. Or, any other signs or symptoms that you may have questions about. DISPOSITION:  x  Home With:  x OT x PT x HH x RN       Long term SNF/Inpatient Rehab    Independent/assisted living    Hospice    Other:       PATIENT CONDITION AT DISCHARGE:     Functional status    Poor    x Deconditioned     Independent      Cognition    x Lucid     Forgetful     Dementia      Catheters/lines (plus indication)    Orozco     PICC     PEG    x None      Code status    x Full code     DNR      PHYSICAL EXAMINATION AT DISCHARGE:                                                 General : Patient seen sitting in a chair by the bedside, alert and oriented. HEENT: PEERL, EOMI, moist mucus membrane, TM clear  Neck: supple, no JVD, no meningeal signs  Chest: Clear to auscultation bilaterally   CVS: S1 S2 heard, Capillary refill less than 2 seconds  Abd: soft, large inguinal hernia, suprapuic catheter, mild red groin folded shon   Ext: Legs bandaged, edema significantly down. Chronic discoloration due to venous stasis. Stage II large open wound on scrotum  Edema improved      Neuro/Psych:no focal weakness.    Skin: warm                            CHRONIC MEDICAL DIAGNOSES:  Problem List as of 3/14/2023 Never Reviewed            Codes Class Noted - Resolved    Chronic diastolic heart failure (HCC) (Chronic) ICD-10-CM: I50.32  ICD-9-CM: 428.32  3/7/2023 - Present        Atrial fibrillation with RVR (HCC) ICD-10-CM: I48.91  ICD-9-CM: 427.31  3/3/2023 - Present        Edema of lower extremity ICD-10-CM: R60.0  ICD-9-CM: 782.3  3/3/2023 - Present        Cellulitis of right lower extremity ICD-10-CM: L03.115  ICD-9-CM: 682.6  3/3/2023 - Present        Cellulitis of left lower extremity ICD-10-CM: L03.116  ICD-9-CM: 682.6  3/3/2023 - Present           Greater than 31 minutes were spent with the patient on counseling and coordination of care    Signed:    Michelle Krause MD  3/14/2023  12:48 PM

## 2023-03-14 NOTE — PROGRESS NOTES
Transition Of Care: The patient is discharging home today with [employer]trip/[employer] transportation scheduled for 2pm transport to home. Home health is [facility]. Patient is independent at baseline and lives alone. Patient prefers no family to be contacted unless its an emergency. RUR:     Medicare pt has received, reviewed, and signed 2nd IM letter informing them of their right to appeal the discharge. Signed copied has been placed on pt bedside chart. CM following for discharge needs.     Marita Vargas RN/CRM

## 2023-03-14 NOTE — WOUND CARE
WOCN Note:     Follow up assessment of bilateral low legs. Chart reviewed. Assessed in room 210. Admitted DX: Atrial fibrillation with RVR   Edema of lower extremity  Cellulitis of left lower extremity  Cellulitis of right lower extremity     PMH:  DM2, venous stasis, AFib,    Assessment:   Patient is A&O x 4, communicative, continent and mobile. Bed: prius air mattress  Patient reports no pain. Generalized edema and blanching erythema to lower legs and feet. Heels intact without erythema. Bilateral low leg cellulitis improved. No weeping noted. 1. POA Left anterior low leg wound, venous wound:  resurfaced to pink. 2.  POA Right lateral low leg proximal, venous wound:  resurfaced to pink  3. POA Right lateral low leg distal, venous wound:  2 x 2 x 0.1 cm; no drainage or odor. 4.  POA Right posterior low leg, venous wound:  2 x 3 x 0.1 cm; no drainage; no odor. Wound, Pressure Prevention & Skin Care Recommendations:    Minimize layers of linen/pads under patient to optimize support surface. 2.  Turn/reposition approximately every 2 hours and offload heels. 3.  Manage moisture/ Keep skin folds clean and dry/minimize brief usage. 4.  Specialty bed: Prius air mattress in place. 5.  Right and left low leg wounds:  Daily cleanse with saline; apply Xeroform; cover with dry gauze, abd pad and roll gauze. 6.  Patient should follow up with Dr Corrie Jones, podiatrist upon discharge. Discussed above plan with patient, Dr Tiffany Perea RN.     Transition of Care:   Plan to follow as needed while admitted to hospital.    WILBERTO Schuler RN [facility]  Available on [employer]  Office [phone]

## 2023-03-14 NOTE — PROGRESS NOTES
Day #11 of Vancomycin  Indication:  Bilateral LE cellulitis  Current regimen:  1250 mg IV Q 12 hr  Abx regimen:  Cefepime + Vancomycin  ID Following ?: NO  Concomitant nephrotoxic drugs (requires more frequent monitoring): Loop diuretics  Frequency of BMP?: Daily through 3/16    Recent Labs     23  0416 23  0736 23  0715   WBC 8.2 8.3 7.5   CREA 0.78 0.74 0.76   BUN 15 14 13     Est CrCl: >100 ml/min; UO: 0.8 ml/kg/hr  Temp (24hrs), Av °F (36.7 °C), Min:97.6 °F (36.4 °C), Max:98.2 °F (36.8 °C)    Cultures:   3/3 Blood: NGTD, final    MRSA Swab ordered (if applicable)? N/A    Goal target range AUC/MOIZ 400-500    Recent level history:  Date/Time Dose & Interval Measured Level (mcg/mL) Associated AUC/MOIZ Dose Adjustment    3/6  1250 mg IV Q 12 hours 21 566 1 gram IV Q 12 hours   3/9 1000 mg IV Q12H 15 372 1250 mg IV Q12H   3/11 1250mg q12h 19.5 (~8.75 hrs p dose) 475 None   3/14 @0617 1250 mg Q24H 21.5  557 1 gram IV Q12H                    3/11: Requested D/c from Dr. Desi Hamilton. 3/13 note states to continue current IV antibiotics. Patient transitioning home in 1-2 days with [facility] PT.     Plan: Changing dose from 1250 mg IV q 12hrs to 1000 mg IV q12hrs to maintain an AUC goal of 400-500. Renal fxn stable. Still hopeful d/c vancomycin, possibly on discharge. WBC WNL, afebrile, cx negative.

## 2023-05-19 RX ORDER — METFORMIN HYDROCHLORIDE 500 MG/1
1000 TABLET, EXTENDED RELEASE ORAL 2 TIMES DAILY
COMMUNITY
Start: 2022-12-09

## 2023-05-19 RX ORDER — METOPROLOL SUCCINATE 100 MG/1
100 TABLET, EXTENDED RELEASE ORAL DAILY
COMMUNITY
Start: 2023-01-07

## 2023-05-19 RX ORDER — DILTIAZEM HYDROCHLORIDE 180 MG/1
180 CAPSULE, EXTENDED RELEASE ORAL DAILY
COMMUNITY

## 2023-05-19 RX ORDER — ERGOCALCIFEROL 1.25 MG/1
CAPSULE ORAL
COMMUNITY
Start: 2022-12-09